# Patient Record
Sex: MALE | Race: WHITE | NOT HISPANIC OR LATINO | Employment: OTHER | URBAN - METROPOLITAN AREA
[De-identification: names, ages, dates, MRNs, and addresses within clinical notes are randomized per-mention and may not be internally consistent; named-entity substitution may affect disease eponyms.]

---

## 2020-12-09 ENCOUNTER — TELEPHONE (OUTPATIENT)
Dept: FAMILY MEDICINE CLINIC | Facility: CLINIC | Age: 65
End: 2020-12-09

## 2020-12-09 NOTE — TELEPHONE ENCOUNTER
Patient needing to refill his medications:  - Metformin HCL 500MG  - Fexofenadine HCL 180MG Tablets  - Losartan Potassium 100MG  - Piogilitazone HCL 15MG    Patient is out of medication and needs these filled.  Patient would like these filled today.  He is scheduled for a new patient appointment on 1/8 at 9:30 with Dr Paredes. He saw him at his previous location.    Verified [facility] on [address] [place] [address].    Patient can be reached at [phone].

## 2020-12-11 NOTE — TELEPHONE ENCOUNTER
I tried calling the patient that [phone] could not get an answer I called his pharmacy and called in prescriptions for the medicines that the patient left with the message.  The patient's did not did not leave instructions such as how many times a day he was taking the medication so I have called and what I could.  Also call back and tell patient that he should schedule an appointment to see me within the next month or so.

## 2021-01-04 RX ORDER — GLIPIZIDE 5 MG/1
TABLET ORAL
Qty: 30 TABLET | Refills: 1 | OUTPATIENT
Start: 2021-01-04

## 2021-01-04 RX ORDER — ATORVASTATIN CALCIUM 20 MG/1
TABLET, FILM COATED ORAL
Qty: 30 TABLET | Refills: 2 | OUTPATIENT
Start: 2021-01-04

## 2021-01-04 NOTE — TELEPHONE ENCOUNTER
"HUB PLEASE INFORM PATIENT, \" PT NEEDS TO SCHEDULE AN APPT BEFORE MEDICATION CAN BE REFILLED.\"    "

## 2021-01-05 ENCOUNTER — TELEPHONE (OUTPATIENT)
Dept: FAMILY MEDICINE CLINIC | Facility: CLINIC | Age: 66
End: 2021-01-05

## 2021-01-05 RX ORDER — ATORVASTATIN CALCIUM 20 MG/1
TABLET, FILM COATED ORAL
Qty: 30 TABLET | Refills: 0 | Status: SHIPPED | OUTPATIENT
Start: 2021-01-05 | End: 2021-01-18 | Stop reason: SDUPTHER

## 2021-01-05 RX ORDER — GLIPIZIDE 5 MG/1
TABLET ORAL
Qty: 30 TABLET | Refills: 0 | Status: SHIPPED | OUTPATIENT
Start: 2021-01-05 | End: 2023-02-17 | Stop reason: SDUPTHER

## 2021-01-05 NOTE — TELEPHONE ENCOUNTER
Caller: Joseph Mcdonald    Relationship: [facility]  [phone]   Best call back number:    Medication needed: Atorvastatin 20 MG, 1 @ bedtime  Glipizide 5 MG 1 AM    When do you need the refill by: 01/08/20    What details did the patient provide when requesting the medication: New patient @ [facility] on 01/18/21.  Will run out in 3 days of the above medications.    Does the patient have less than a 3 day supply:  [x] Yes  [] No    What is the patient's preferred pharmacy:        [facility] [address] - [phone]  - [phone]   [phone]

## 2021-01-18 ENCOUNTER — OFFICE VISIT (OUTPATIENT)
Dept: FAMILY MEDICINE CLINIC | Facility: CLINIC | Age: 66
End: 2021-01-18

## 2021-01-18 VITALS
HEART RATE: 78 BPM | RESPIRATION RATE: 16 BRPM | OXYGEN SATURATION: 98 % | SYSTOLIC BLOOD PRESSURE: 138 MMHG | HEIGHT: 70 IN | BODY MASS INDEX: 45.1 KG/M2 | DIASTOLIC BLOOD PRESSURE: 98 MMHG | TEMPERATURE: 96.1 F | WEIGHT: 315 LBS

## 2021-01-18 DIAGNOSIS — E10.9 TYPE 1 DIABETES MELLITUS WITHOUT COMPLICATION (HCC): ICD-10-CM

## 2021-01-18 DIAGNOSIS — I10 ESSENTIAL HYPERTENSION: ICD-10-CM

## 2021-01-18 DIAGNOSIS — Z91.199 NONCOMPLIANCE: ICD-10-CM

## 2021-01-18 DIAGNOSIS — E66.01 MORBID OBESITY (HCC): Primary | ICD-10-CM

## 2021-01-18 PROBLEM — Z98.84 LAP-BAND SURGERY STATUS: Status: ACTIVE | Noted: 2020-01-06

## 2021-01-18 PROBLEM — E78.5 HYPERLIPIDEMIA: Status: ACTIVE | Noted: 2020-01-06

## 2021-01-18 PROBLEM — IMO0002 DIABETES MELLITUS TYPE 2, UNCONTROLLED, WITH COMPLICATIONS: Status: ACTIVE | Noted: 2020-01-06

## 2021-01-18 PROCEDURE — 99214 OFFICE O/P EST MOD 30 MIN: CPT | Performed by: INTERNAL MEDICINE

## 2021-01-18 RX ORDER — ATORVASTATIN CALCIUM 20 MG/1
TABLET, FILM COATED ORAL
Qty: 30 TABLET | Refills: 0 | Status: SHIPPED | OUTPATIENT
Start: 2021-01-18 | End: 2021-02-09

## 2021-01-18 RX ORDER — VALSARTAN 320 MG/1
320 TABLET ORAL DAILY
COMMUNITY
End: 2021-01-18

## 2021-01-18 RX ORDER — CETIRIZINE HYDROCHLORIDE 10 MG/1
10 TABLET ORAL DAILY
COMMUNITY
End: 2021-08-20

## 2021-01-18 RX ORDER — PIOGLITAZONEHYDROCHLORIDE 15 MG/1
TABLET ORAL
COMMUNITY
Start: 2020-12-11 | End: 2021-02-09

## 2021-01-18 RX ORDER — FUROSEMIDE 40 MG/1
40 TABLET ORAL DAILY
COMMUNITY
End: 2021-01-18

## 2021-01-18 RX ORDER — ALBUTEROL SULFATE 2.5 MG/3ML
2.5 SOLUTION RESPIRATORY (INHALATION)
COMMUNITY
End: 2021-01-18

## 2021-01-18 RX ORDER — DOXAZOSIN MESYLATE 4 MG/1
TABLET ORAL
COMMUNITY
Start: 2020-12-31 | End: 2021-02-09

## 2021-01-18 RX ORDER — GLIPIZIDE 5 MG/1
TABLET ORAL
Qty: 90 TABLET | Refills: 3 | Status: SHIPPED | OUTPATIENT
Start: 2021-01-18 | End: 2021-02-09

## 2021-01-18 RX ORDER — HYDRALAZINE HYDROCHLORIDE 25 MG/1
25 TABLET, FILM COATED ORAL
COMMUNITY
End: 2021-01-18

## 2021-01-18 RX ORDER — NAPROXEN 500 MG/1
500 TABLET ORAL
COMMUNITY
End: 2021-05-11

## 2021-01-18 RX ORDER — PIOGLITAZONEHYDROCHLORIDE 15 MG/1
TABLET ORAL
Qty: 90 TABLET | Refills: 3 | Status: SHIPPED | OUTPATIENT
Start: 2021-01-18

## 2021-01-18 RX ORDER — LOSARTAN POTASSIUM 100 MG/1
TABLET ORAL
COMMUNITY
Start: 2020-12-11 | End: 2021-02-18

## 2021-01-18 RX ORDER — ATORVASTATIN CALCIUM 20 MG/1
20 TABLET, FILM COATED ORAL DAILY
Qty: 90 TABLET | Refills: 3 | Status: SHIPPED | OUTPATIENT
Start: 2021-01-18 | End: 2021-08-20 | Stop reason: SDUPTHER

## 2021-01-18 RX ORDER — ORAL SEMAGLUTIDE 7 MG/1
TABLET ORAL
COMMUNITY
Start: 2021-01-05 | End: 2021-08-20

## 2021-01-18 RX ORDER — MULTIVITAMIN WITH IRON
100 TABLET ORAL DAILY
COMMUNITY
End: 2022-10-26

## 2021-01-18 RX ORDER — DOXAZOSIN MESYLATE 4 MG/1
TABLET ORAL
Qty: 90 TABLET | Refills: 2 | Status: SHIPPED | OUTPATIENT
Start: 2021-01-18 | End: 2022-02-28

## 2021-01-18 RX ORDER — ROSUVASTATIN CALCIUM 5 MG/1
5 TABLET, COATED ORAL DAILY
COMMUNITY
End: 2021-01-18

## 2021-01-18 RX ORDER — GLIMEPIRIDE 4 MG/1
4 TABLET ORAL
COMMUNITY
End: 2021-01-18

## 2021-01-18 RX ORDER — FEXOFENADINE HCL 180 MG/1
180 TABLET ORAL DAILY
COMMUNITY
End: 2021-02-18

## 2021-01-18 RX ORDER — AMLODIPINE BESYLATE 10 MG/1
10 TABLET ORAL DAILY
COMMUNITY
End: 2021-01-18

## 2021-01-19 NOTE — PROGRESS NOTES
01/18/2021    CC: Hypertension (follow up) and Diabetes (follow up)  .        HPI  This patient presents for follow-up of diabetes mellitus type 1.  He is been lost to follow-up for approximately a year.  He relates that he is currently seen by endocrinology with Dr. baig but due to the Covid 19 pandemic he has not seen him since March.       Subjective   Joseph Mcdonald is a 65 y.o. male.      The following portions of the patient's history were reviewed and updated as appropriate: allergies, current medications, past family history, past medical history, past social history, past surgical history and problem list.    Problem List  Patient Active Problem List   Diagnosis   • Diabetes mellitus type 2, uncontrolled, with complications (CMS/HCC)   • HTN (hypertension)   • Hyperlipidemia   • LAP-BAND surgery status   • Morbid obesity (CMS/HCC)   • Morbid obesity with BMI of 50.0-59.9, adult (CMS/HCC)       Past Medical History  Past Medical History:   Diagnosis Date   • Diabetes mellitus (CMS/HCC)    • Hyperlipidemia    • Hypertension        Surgical History  History reviewed. No pertinent surgical history.    Family History  History reviewed. No pertinent family history.    Social History  Social History    Tobacco Use      Smoking status: Not on file       Is the Patient a current tobacco user? No    Allergies  Allergies   Allergen Reactions   • Codeine Diarrhea and Nausea And Vomiting   • Penicillins Other (See Comments)   • Sulfa Antibiotics Other (See Comments)       Current Medications    Current Outpatient Medications:   •  atorvastatin (LIPITOR) 20 MG tablet, 1 q am, Disp: 30 tablet, Rfl: 0  •  cetirizine (zyrTEC) 10 MG tablet, Take 10 mg by mouth Daily., Disp: , Rfl:   •  doxazosin (CARDURA) 4 MG tablet, , Disp: , Rfl:   •  fexofenadine (ALLEGRA) 180 MG tablet, Take 180 mg by mouth Daily., Disp: , Rfl:   •  glipizide (Glucotrol) 5 MG tablet, 1 q am, Disp: 30 tablet, Rfl: 0  •  losartan (COZAAR) 100 MG tablet, ,  Disp: , Rfl:   •  metFORMIN (GLUCOPHAGE) 500 MG tablet, Take 2 tablets by mouth 2 (Two) Times a Day With Meals., Disp: 360 tablet, Rfl: 2  •  naproxen (NAPROSYN) 500 MG tablet, Take 500 mg by mouth., Disp: , Rfl:   •  pioglitazone (ACTOS) 15 MG tablet, , Disp: , Rfl:   •  Rybelsus 7 MG tablet, , Disp: , Rfl:   •  vitamin B-6 (PYRIDOXINE) 100 MG tablet, Take 100 mg by mouth Daily., Disp: , Rfl:   •  atorvastatin (LIPITOR) 20 MG tablet, Take 1 tablet by mouth Daily., Disp: 90 tablet, Rfl: 3  •  doxazosin (Cardura) 4 MG tablet, 1 daily, Disp: 90 tablet, Rfl: 2  •  glipizide (Glucotrol) 5 MG tablet, Take 1 tablet every morning, Disp: 90 tablet, Rfl: 3  •  pioglitazone (Actos) 15 MG tablet, 1 tablet every morning, Disp: 90 tablet, Rfl: 3     Review of System  Review of Systems   Eyes: Negative.    Respiratory: Negative.    Cardiovascular: Negative.    Gastrointestinal: Negative.    Endocrine: Negative.      I have reviewed and confirmed the accuracy of the ROS as documented by the MA/LPN/RN Dexter Paredes MD    Vitals:    01/18/21 1612   BP: 138/98   Pulse: 78   Resp: 16   Temp: 96.1 °F (35.6 °C)   SpO2: 98%     Body mass index is 59.17 kg/m².    Objective     Physical Exam  Physical Exam  Cardiovascular:      Rate and Rhythm: Normal rate and regular rhythm.      Pulses: Normal pulses.      Heart sounds: Normal heart sounds.   Pulmonary:      Effort: Pulmonary effort is normal.      Breath sounds: Normal breath sounds.         Assessment/Plan      This patient presents for follow-up after having been lost to follow-up secondary to the Covid 19 pandemic.  He had seen Dr. baig in the past but has not seen him over the past several months.  Patient has a long history of poorly controlled diabetes and noncompliance.  He is morbidly obese with a weight greater than 400 pounds and a BMI greater than 59.    Patient relates she has not been compliant of his diet again.  He relates he had a heavy sodium meal last night and  pork and beans.  He relates he is knowledgeable of this is not a part of his diet.  He relates that his glucose level today was 105.  Note is made that he has had lap band surgery in the past.    Patient Is Out Of His Medications at This Point We'll Renew Them and Ask Him to Follow-Up with Dr. Baig.             Diagnoses and all orders for this visit:    1. Morbid obesity (CMS/HCC) (Primary)  -     Comprehensive Metabolic Panel  -     T4, Free  -     TSH    2. Type 1 diabetes mellitus without complication (CMS/HCC)  -     Hemoglobin A1c  -     Lipid Panel With / Chol / HDL Ratio  -     Urinalysis With Culture If Indicated -  -     CBC & Differential  -     metFORMIN (GLUCOPHAGE) 500 MG tablet; Take 2 tablets by mouth 2 (Two) Times a Day With Meals.  Dispense: 360 tablet; Refill: 2  -     glipizide (Glucotrol) 5 MG tablet; Take 1 tablet every morning  Dispense: 90 tablet; Refill: 3  -     atorvastatin (LIPITOR) 20 MG tablet; Take 1 tablet by mouth Daily.  Dispense: 90 tablet; Refill: 3  -     atorvastatin (LIPITOR) 20 MG tablet; 1 q am  Dispense: 30 tablet; Refill: 0  -     doxazosin (Cardura) 4 MG tablet; 1 daily  Dispense: 90 tablet; Refill: 2  -     pioglitazone (Actos) 15 MG tablet; 1 tablet every morning  Dispense: 90 tablet; Refill: 3    3. Essential hypertension    4. Noncompliance      Plan:  1.)  Follow-up in 3-4 weeks for reevaluation of glucose control of her to follow-up with Dr. baig for long-standing diabetes evaluation and management       Dexter Paredes MD  01/18/2021

## 2021-01-30 LAB
ALBUMIN SERPL-MCNC: 4 G/DL (ref 3.5–5.2)
ALBUMIN/GLOB SERPL: 1.5 G/DL
ALP SERPL-CCNC: 106 U/L (ref 39–117)
ALT SERPL-CCNC: 20 U/L (ref 1–41)
AST SERPL-CCNC: 20 U/L (ref 1–40)
BASOPHILS # BLD AUTO: 0.06 10*3/MM3 (ref 0–0.2)
BASOPHILS NFR BLD AUTO: 1 % (ref 0–1.5)
BILIRUB SERPL-MCNC: 0.4 MG/DL (ref 0–1.2)
BUN SERPL-MCNC: 11 MG/DL (ref 8–23)
BUN/CREAT SERPL: 14.9 (ref 7–25)
CALCIUM SERPL-MCNC: 9.3 MG/DL (ref 8.6–10.5)
CHLORIDE SERPL-SCNC: 99 MMOL/L (ref 98–107)
CHOLEST SERPL-MCNC: 177 MG/DL (ref 0–200)
CHOLEST/HDLC SERPL: 4.32 {RATIO}
CO2 SERPL-SCNC: 31.2 MMOL/L (ref 22–29)
CREAT SERPL-MCNC: 0.74 MG/DL (ref 0.76–1.27)
EOSINOPHIL # BLD AUTO: 0.07 10*3/MM3 (ref 0–0.4)
EOSINOPHIL NFR BLD AUTO: 1.2 % (ref 0.3–6.2)
ERYTHROCYTE [DISTWIDTH] IN BLOOD BY AUTOMATED COUNT: 12.7 % (ref 12.3–15.4)
GLOBULIN SER CALC-MCNC: 2.7 GM/DL
GLUCOSE SERPL-MCNC: 193 MG/DL (ref 65–99)
HBA1C MFR BLD: 7.8 % (ref 4.8–5.6)
HCT VFR BLD AUTO: 44.5 % (ref 37.5–51)
HDLC SERPL-MCNC: 41 MG/DL (ref 40–60)
HGB BLD-MCNC: 14.6 G/DL (ref 13–17.7)
IMM GRANULOCYTES # BLD AUTO: 0.02 10*3/MM3 (ref 0–0.05)
IMM GRANULOCYTES NFR BLD AUTO: 0.3 % (ref 0–0.5)
LDLC SERPL CALC-MCNC: 110 MG/DL (ref 0–100)
LYMPHOCYTES # BLD AUTO: 1.34 10*3/MM3 (ref 0.7–3.1)
LYMPHOCYTES NFR BLD AUTO: 23.3 % (ref 19.6–45.3)
MCH RBC QN AUTO: 30.1 PG (ref 26.6–33)
MCHC RBC AUTO-ENTMCNC: 32.8 G/DL (ref 31.5–35.7)
MCV RBC AUTO: 91.8 FL (ref 79–97)
MONOCYTES # BLD AUTO: 0.52 10*3/MM3 (ref 0.1–0.9)
MONOCYTES NFR BLD AUTO: 9 % (ref 5–12)
NEUTROPHILS # BLD AUTO: 3.74 10*3/MM3 (ref 1.7–7)
NEUTROPHILS NFR BLD AUTO: 65.2 % (ref 42.7–76)
NRBC BLD AUTO-RTO: 0 /100 WBC (ref 0–0.2)
PLATELET # BLD AUTO: 207 10*3/MM3 (ref 140–450)
POTASSIUM SERPL-SCNC: 4.5 MMOL/L (ref 3.5–5.2)
PROT SERPL-MCNC: 6.7 G/DL (ref 6–8.5)
RBC # BLD AUTO: 4.85 10*6/MM3 (ref 4.14–5.8)
SODIUM SERPL-SCNC: 141 MMOL/L (ref 136–145)
T4 FREE SERPL-MCNC: 1.11 NG/DL (ref 0.93–1.7)
TRIGL SERPL-MCNC: 147 MG/DL (ref 0–150)
TSH SERPL DL<=0.005 MIU/L-ACNC: 2.5 UIU/ML (ref 0.27–4.2)
UNABLE TO VOID: NORMAL
VLDLC SERPL CALC-MCNC: 26 MG/DL (ref 5–40)
WBC # BLD AUTO: 5.75 10*3/MM3 (ref 3.4–10.8)

## 2021-02-04 DIAGNOSIS — I10 HYPERTENSION, UNSPECIFIED TYPE: Primary | ICD-10-CM

## 2021-02-04 DIAGNOSIS — J30.1 ALLERGIC RHINITIS DUE TO POLLEN, UNSPECIFIED SEASONALITY: ICD-10-CM

## 2021-02-09 ENCOUNTER — OFFICE VISIT (OUTPATIENT)
Dept: FAMILY MEDICINE CLINIC | Facility: CLINIC | Age: 66
End: 2021-02-09

## 2021-02-09 VITALS
HEART RATE: 64 BPM | HEIGHT: 70 IN | WEIGHT: 315 LBS | DIASTOLIC BLOOD PRESSURE: 88 MMHG | OXYGEN SATURATION: 99 % | TEMPERATURE: 98.1 F | SYSTOLIC BLOOD PRESSURE: 140 MMHG | BODY MASS INDEX: 45.1 KG/M2 | RESPIRATION RATE: 16 BRPM

## 2021-02-09 DIAGNOSIS — Z83.71 FH: COLON POLYPS: Primary | Chronic | ICD-10-CM

## 2021-02-09 DIAGNOSIS — Z00.00 PE (PHYSICAL EXAM), ANNUAL: ICD-10-CM

## 2021-02-09 PROCEDURE — 99396 PREV VISIT EST AGE 40-64: CPT | Performed by: INTERNAL MEDICINE

## 2021-02-10 NOTE — PROGRESS NOTES
2021    CC: Annual Exam (...no other issues)  .        HPI  History of Present Illness     Subjective   Joseph Mcdonald is a 65 y.o. male.      The following portions of the patient's history were reviewed and updated as appropriate: allergies, current medications, past family history, past medical history, past social history, past surgical history and problem list.    Problem List  Patient Active Problem List   Diagnosis   • Diabetes mellitus type 2, uncontrolled, with complications (CMS/[facility])   • HTN (hypertension)   • Hyperlipidemia   • LAP-BAND surgery status   • Morbid obesity (CMS/HCC)   • Morbid obesity with BMI of 50.0-59.9, adult (CMS/[facility])       Past Medical History  Past Medical History:   Diagnosis Date   • Diabetes mellitus (CMS/HCC)    • Hyperlipidemia    • Hypertension        Surgical History  History reviewed. No pertinent surgical history.    Family History  History reviewed. No pertinent family history.    Social History  Social History    Tobacco Use      Smoking status: Former Smoker        Packs/day: 0.50        Years: 10.00        Pack years: 5        Types: Cigarettes        Quit date:         Years since quittin.1      Smokeless tobacco: Never Used       Is the Patient a current tobacco user? No    Allergies  Allergies   Allergen Reactions   • Codeine Diarrhea and Nausea And Vomiting   • Penicillins Other (See Comments)   • Sulfa Antibiotics Other (See Comments)       Current Medications    Current Outpatient Medications:   •  atorvastatin (LIPITOR) 20 MG tablet, Take 1 tablet by mouth Daily., Disp: 90 tablet, Rfl: 3  •  cetirizine (zyrTEC) 10 MG tablet, Take 10 mg by mouth Daily., Disp: , Rfl:   •  doxazosin (Cardura) 4 MG tablet, 1 daily (Patient taking differently: Take 4 mg by mouth Every Night. 1 daily), Disp: 90 tablet, Rfl: 2  •  fexofenadine (ALLEGRA) 180 MG tablet, Take 180 mg by mouth Daily., Disp: , Rfl:   •  glipizide (Glucotrol) 5 MG tablet, 1 q am, Disp: 30 tablet,  Rfl: 0  •  losartan (COZAAR) 100 MG tablet, , Disp: , Rfl:   •  metFORMIN (GLUCOPHAGE) 500 MG tablet, Take 2 tablets by mouth 2 (Two) Times a Day With Meals., Disp: 360 tablet, Rfl: 2  •  naproxen (NAPROSYN) 500 MG tablet, Take 500 mg by mouth., Disp: , Rfl:   •  pioglitazone (Actos) 15 MG tablet, 1 tablet every morning, Disp: 90 tablet, Rfl: 3  •  Rybelsus 7 MG tablet, , Disp: , Rfl:   •  vitamin B-6 (PYRIDOXINE) 100 MG tablet, Take 100 mg by mouth Daily., Disp: , Rfl:      Review of System  Review of Systems   Constitutional: Negative.    HENT: Negative.    Eyes: Negative.    Respiratory: Negative.    Cardiovascular: Negative.    Gastrointestinal: Negative.    Musculoskeletal: Negative.    Skin: Negative.    Psychiatric/Behavioral: Negative.      I have reviewed and confirmed the accuracy of the ROS as documented by the MA/LPN/RN Dexter Paredes MD    Vitals:    02/09/21 1308   BP: 140/88   Pulse: 64   Resp: 16   Temp: 98.1 °F (36.7 °C)   SpO2: 99%     Body mass index is 59.12 kg/m².    Objective     Physical Exam  Physical Exam  Constitutional:       Appearance: Normal appearance. He is obese.   HENT:      Head: Normocephalic and atraumatic.      Right Ear: External ear normal.      Left Ear: External ear normal.      Nose: Nose normal.   Eyes:      Extraocular Movements: Extraocular movements intact.   Neck:      Musculoskeletal: Normal range of motion.   Cardiovascular:      Rate and Rhythm: Normal rate and regular rhythm.      Pulses: Normal pulses.      Heart sounds: Normal heart sounds.   Pulmonary:      Effort: Pulmonary effort is normal.      Breath sounds: Normal breath sounds.   Musculoskeletal: Normal range of motion.   Skin:     General: Skin is warm and dry.   Neurological:      General: No focal deficit present.      Mental Status: He is alert and oriented to person, place, and time.   Psychiatric:         Mood and Affect: Mood normal.         Assessment/Plan    This patient presents for physical  examination.  He relates she's feeling fine, he's had no problems in the past week.    He is morbidly obese having a BMI of greater than 59.1.  He relates that he realizes he has a problem with weight but he enjoys food and he wants to continue eating.    The patient's last hemoglobin A1c done about a month ago was elevated at 7.8.  He realizes that this is due to his poor dietary compliance and his inconsistent taking medication.  He relates that he enjoys eating and realizes that.    He lives with 2 sisters and is unmarried.    We discussed  The Covid 19 program.  The patient relates that he does not want to take any vaccine he has not had a vaccine for influenza or pneumonia and relates that he just does not want to take them.    He relates that his last colonoscopy was greater than 10 years ago.  But he refuses to have a colonoscopy understanding the increased risk of colon cancer and other GI problems.            Diagnoses and all orders for this visit:    1. FH: colon polyps (Primary)  -     POCT Occult blood x 3, stool  -     Cologuard - Stool, Per Rectum; Future    2. PE (physical exam), annual             Dexter Paredes MD  02/09/2021

## 2021-02-18 DIAGNOSIS — I10 HYPERTENSION, UNSPECIFIED TYPE: Primary | ICD-10-CM

## 2021-02-18 DIAGNOSIS — J30.2 SEASONAL ALLERGIES: ICD-10-CM

## 2021-02-18 RX ORDER — LOSARTAN POTASSIUM 100 MG/1
TABLET ORAL
Qty: 30 TABLET | Refills: 3 | Status: SHIPPED | OUTPATIENT
Start: 2021-02-18 | End: 2021-08-24

## 2021-02-18 RX ORDER — FEXOFENADINE HCL 180 MG/1
TABLET ORAL
Qty: 30 TABLET | Refills: 3 | Status: SHIPPED | OUTPATIENT
Start: 2021-02-18 | End: 2021-09-13

## 2021-02-18 RX ORDER — LOSARTAN POTASSIUM 100 MG/1
100 TABLET ORAL DAILY
Qty: 30 TABLET | Refills: 5 | Status: CANCELLED | OUTPATIENT
Start: 2021-02-18

## 2021-02-18 RX ORDER — FEXOFENADINE HCL 180 MG/1
180 TABLET ORAL DAILY
Qty: 30 TABLET | Refills: 5 | Status: CANCELLED | OUTPATIENT
Start: 2021-02-18

## 2021-05-11 ENCOUNTER — OFFICE VISIT (OUTPATIENT)
Dept: FAMILY MEDICINE CLINIC | Facility: CLINIC | Age: 66
End: 2021-05-11

## 2021-05-11 VITALS
HEART RATE: 83 BPM | RESPIRATION RATE: 16 BRPM | BODY MASS INDEX: 59.12 KG/M2 | HEIGHT: 70 IN | SYSTOLIC BLOOD PRESSURE: 120 MMHG | DIASTOLIC BLOOD PRESSURE: 78 MMHG | OXYGEN SATURATION: 98 %

## 2021-05-11 DIAGNOSIS — Z12.5 SCREENING FOR PROSTATE CANCER: ICD-10-CM

## 2021-05-11 DIAGNOSIS — IMO0002 DIABETES MELLITUS TYPE 2, UNCONTROLLED, WITH COMPLICATIONS: Primary | ICD-10-CM

## 2021-05-11 DIAGNOSIS — R35.1 NOCTURIA: ICD-10-CM

## 2021-05-11 LAB
EXPIRATION DATE: NORMAL
HBA1C MFR BLD: 7 %
Lab: NORMAL

## 2021-05-11 PROCEDURE — 99213 OFFICE O/P EST LOW 20 MIN: CPT | Performed by: INTERNAL MEDICINE

## 2021-05-11 PROCEDURE — 36416 COLLJ CAPILLARY BLOOD SPEC: CPT | Performed by: INTERNAL MEDICINE

## 2021-05-11 PROCEDURE — 83036 HEMOGLOBIN GLYCOSYLATED A1C: CPT | Performed by: INTERNAL MEDICINE

## 2021-05-11 RX ORDER — FLASH GLUCOSE SENSOR
1 KIT MISCELLANEOUS
COMMUNITY
Start: 2021-03-29 | End: 2023-02-17

## 2021-05-12 LAB
ALBUMIN/CREAT UR: 116 MG/G CREAT (ref 0–29)
CREAT UR-MCNC: 87 MG/DL
MICROALBUMIN UR-MCNC: 100.9 UG/ML
PSA SERPL-MCNC: 0.65 NG/ML (ref 0–4)

## 2021-05-13 NOTE — PROGRESS NOTES
2021    CC: Diabetes (follow up.  diarrhea due to medication.  overactive bladder)  .        HPI  Diabetes  He presents for his follow-up diabetic visit. He has type 2 diabetes mellitus. No MedicAlert identification noted. His disease course has been stable. There are no hypoglycemic associated symptoms. There are no diabetic associated symptoms. There are no hypoglycemic complications. Symptoms are worsening. There are no diabetic complications.        Subjective   Joseph Mcdonald is a 65 y.o. male.      The following portions of the patient's history were reviewed and updated as appropriate: allergies, current medications, past family history, past medical history, past social history, past surgical history and problem list.    Problem List  Patient Active Problem List   Diagnosis   • Diabetes mellitus type 2, uncontrolled, with complications (CMS/[facility])   • HTN (hypertension)   • Hyperlipidemia   • LAP-BAND surgery status   • Morbid obesity (CMS/[facility])   • Morbid obesity with BMI of 50.0-59.9, adult (CMS/[facility])       Past Medical History  Past Medical History:   Diagnosis Date   • Diabetes mellitus (CMS/[facility])    • Hyperlipidemia    • Hypertension        Surgical History  History reviewed. No pertinent surgical history.    Family History  History reviewed. No pertinent family history.    Social History  Social History    Tobacco Use      Smoking status: Former Smoker        Packs/day: 0.50        Years: 10.00        Pack years: 5        Types: Cigarettes        Quit date: 1970        Years since quittin.3      Smokeless tobacco: Never Used       Is the Patient a current tobacco user? No    Allergies  Allergies   Allergen Reactions   • Codeine Diarrhea and Nausea And Vomiting   • Penicillins Other (See Comments)   • Sulfa Antibiotics Other (See Comments)       Current Medications    Current Outpatient Medications:   •  atorvastatin (LIPITOR) 20 MG tablet, Take 1 tablet by mouth Daily., Disp: 90 tablet, Rfl: 3  •   cetirizine (zyrTEC) 10 MG tablet, Take 10 mg by mouth Daily., Disp: , Rfl:   •  Continuous Blood Gluc Sensor (FreeStyle Missy 14 Day Sensor) misc, Apply 1 Device topically to the appropriate area as directed., Disp: , Rfl:   •  doxazosin (Cardura) 4 MG tablet, 1 daily (Patient taking differently: Take 4 mg by mouth Every Night. 1 daily), Disp: 90 tablet, Rfl: 2  •  fexofenadine (ALLEGRA) 180 MG tablet, TAKE ONE TABLET BY MOUTH DAILY, Disp: 30 tablet, Rfl: 3  •  glipizide (Glucotrol) 5 MG tablet, 1 q am, Disp: 30 tablet, Rfl: 0  •  losartan (COZAAR) 100 MG tablet, TAKE ONE TABLET BY MOUTH EVERY MORNING, Disp: 30 tablet, Rfl: 3  •  metFORMIN (GLUCOPHAGE) 500 MG tablet, Take 2 tablets by mouth 2 (Two) Times a Day With Meals., Disp: 360 tablet, Rfl: 2  •  pioglitazone (Actos) 15 MG tablet, 1 tablet every morning, Disp: 90 tablet, Rfl: 3  •  Rybelsus 7 MG tablet, , Disp: , Rfl:   •  vitamin B-6 (PYRIDOXINE) 100 MG tablet, Take 100 mg by mouth Daily., Disp: , Rfl:      Review of System  Review of Systems   Respiratory: Negative.    Cardiovascular: Negative.    Gastrointestinal: Negative.    Endocrine: Negative.      I have reviewed and confirmed the accuracy of the ROS as documented by the MA/LPN/RN Dexter Paredes MD    Vitals:    05/11/21 1058   BP: 120/78   Pulse: 83   Resp: 16   SpO2: 98%     Body mass index is 59.12 kg/m².    Objective     Physical Exam  Physical Exam  Cardiovascular:      Rate and Rhythm: Normal rate and regular rhythm.      Pulses: Normal pulses.      Heart sounds: Normal heart sounds.   Pulmonary:      Effort: Pulmonary effort is normal.      Breath sounds: Normal breath sounds.   Abdominal:      General: Abdomen is flat.      Palpations: Abdomen is soft.         Assessment/Plan      This pleasant 65-year-old presents at this time for follow-up of diabetes mellitus type 2.  He is usually seen by Dr. baig endocrinologist with [facility] but the patient has some trouble getting in and so we will  see him today.  The patient was last seen by me on 1/21 his hemoglobin A1c was 7.8.    Patient's hemoglobin A1c today is 7.0.  He is currently on Actos, metformin glipizide and Rebelsus    Patient's blood pressure well controlled today at 120/78 in the left arm sitting position standard cuff.    His foot exam was unremarkable.  Monofilament discrimination was normal throughout both feet.  Good pulsations were appreciated anterior tibial and dorsalis pedis pulses.    Patient relates that he's had nocturia for the past week.  We'll check for the possibility of urinary tract infection versus BPH today and see him in follow-up.    He is urged to follow-up with Dr. baig regarding ongoing diabetes care and would complement the patient on obtaining a hemoglobin A1c of 7.0.    Diagnoses and all orders for this visit:    1. Diabetes mellitus type 2, uncontrolled, with complications (CMS/[facility]) (Primary)  -     POCT glycated hemoglobin, total; Standing  -     POCT glycated hemoglobin, total  -     Cancel: Microalbumin / Creatinine Urine Ratio - Urine, Clean Catch; Future  -     Microalbumin / Creatinine Urine Ratio - Urine, Clean Catch    2. Nocturia  -     Cancel: Urinalysis With Culture If Indicated -  -     Urinalysis With Culture If Indicated -    3. Screening for prostate cancer  -     Cancel: PSA SCREENING; Future  -     PSA SCREENING             eDxter Paredes MD  05/11/2021

## 2021-08-20 ENCOUNTER — OFFICE VISIT (OUTPATIENT)
Dept: FAMILY MEDICINE CLINIC | Facility: CLINIC | Age: 66
End: 2021-08-20

## 2021-08-20 VITALS
SYSTOLIC BLOOD PRESSURE: 110 MMHG | HEIGHT: 70 IN | RESPIRATION RATE: 16 BRPM | WEIGHT: 315 LBS | DIASTOLIC BLOOD PRESSURE: 70 MMHG | BODY MASS INDEX: 45.1 KG/M2

## 2021-08-20 DIAGNOSIS — IMO0002 DIABETES MELLITUS TYPE 2, UNCONTROLLED, WITH COMPLICATIONS: Chronic | ICD-10-CM

## 2021-08-20 DIAGNOSIS — I10 ESSENTIAL HYPERTENSION: Primary | ICD-10-CM

## 2021-08-20 LAB
EXPIRATION DATE: ABNORMAL
HBA1C MFR BLD: 7.3 %
Lab: ABNORMAL

## 2021-08-20 PROCEDURE — 83036 HEMOGLOBIN GLYCOSYLATED A1C: CPT | Performed by: INTERNAL MEDICINE

## 2021-08-20 PROCEDURE — 36416 COLLJ CAPILLARY BLOOD SPEC: CPT | Performed by: INTERNAL MEDICINE

## 2021-08-20 PROCEDURE — 99214 OFFICE O/P EST MOD 30 MIN: CPT | Performed by: INTERNAL MEDICINE

## 2021-08-20 PROCEDURE — 3051F HG A1C>EQUAL 7.0%<8.0%: CPT | Performed by: INTERNAL MEDICINE

## 2021-08-20 NOTE — PROGRESS NOTES
2021    CC: Diabetes (f/u...no other issues)  .        HPI  Diabetes  He presents for his follow-up diabetic visit. He has type 2 diabetes mellitus. No MedicAlert identification noted. His disease course has been stable. There are no hypoglycemic associated symptoms. There are no diabetic associated symptoms. There are no hypoglycemic complications.        Subjective   Joseph Mcdonald is a 65 y.o. male.      The following portions of the patient's history were reviewed and updated as appropriate: allergies, current medications, past family history, past medical history, past social history, past surgical history and problem list.    Problem List  Patient Active Problem List   Diagnosis   • Diabetes mellitus type 2, uncontrolled, with complications (CMS/[facility])   • HTN (hypertension)   • Hyperlipidemia   • LAP-BAND surgery status   • Morbid obesity (CMS/[facility])   • Morbid obesity with BMI of 50.0-59.9, adult (CMS/[facility])       Past Medical History  Past Medical History:   Diagnosis Date   • Diabetes mellitus (CMS/[facility])    • Hyperlipidemia    • Hypertension        Surgical History  History reviewed. No pertinent surgical history.    Family History  History reviewed. No pertinent family history.    Social History  Social History    Tobacco Use      Smoking status: Former Smoker        Packs/day: 0.50        Years: 10.00        Pack years: 5        Types: Cigarettes        Quit date: 1970        Years since quittin.6      Smokeless tobacco: Never Used       Is the Patient a current tobacco user? No    Allergies  Allergies   Allergen Reactions   • Codeine Diarrhea and Nausea And Vomiting   • Penicillins Other (See Comments)   • Sulfa Antibiotics Other (See Comments)       Current Medications    Current Outpatient Medications:   •  Continuous Blood Gluc Sensor (FreeStyle Missy 14 Day Sensor) misc, Apply 1 Device topically to the appropriate area as directed., Disp: , Rfl:   •  doxazosin (Cardura) 4 MG tablet, 1 daily  (Patient taking differently: Take 4 mg by mouth Every Night. 1 daily), Disp: 90 tablet, Rfl: 2  •  fexofenadine (ALLEGRA) 180 MG tablet, TAKE ONE TABLET BY MOUTH DAILY, Disp: 30 tablet, Rfl: 3  •  glipizide (Glucotrol) 5 MG tablet, 1 q am (Patient taking differently: Take 5 mg by mouth 2 (Two) Times a Day Before Meals.), Disp: 30 tablet, Rfl: 0  •  losartan (COZAAR) 100 MG tablet, TAKE ONE TABLET BY MOUTH EVERY MORNING, Disp: 30 tablet, Rfl: 3  •  metFORMIN (GLUCOPHAGE) 500 MG tablet, Take 2 tablets by mouth 2 (Two) Times a Day With Meals., Disp: 360 tablet, Rfl: 2  •  pioglitazone (Actos) 15 MG tablet, 1 tablet every morning (Patient taking differently: 30 mg. 1 tablet every morning), Disp: 90 tablet, Rfl: 3  •  vitamin B-6 (PYRIDOXINE) 100 MG tablet, Take 100 mg by mouth Daily., Disp: , Rfl:      Review of System  Review of Systems   Constitutional: Negative.    HENT: Negative.    Eyes: Negative.    Respiratory: Negative.    Cardiovascular: Negative.      I have reviewed and confirmed the accuracy of the ROS as documented by the MA/LPN/RN Dexter Paredes MD    Vitals:    08/20/21 1110   BP: 110/70   Resp: 16     Body mass index is 60.84 kg/m².    Objective     Physical Exam  Physical Exam  Constitutional:       Appearance: He is obese.   HENT:      Head: Normocephalic and atraumatic.   Cardiovascular:      Rate and Rhythm: Normal rate and regular rhythm.      Pulses: Normal pulses.      Heart sounds: Normal heart sounds.   Pulmonary:      Effort: Pulmonary effort is normal.      Breath sounds: Normal breath sounds.         Assessment/Plan      This pleasant 65-year-old presents at this time for follow-up of diabetes mellitus.  He relates he is feeling fine has had no problems in the interim of visits.  We note that is gained 12 pounds from his last visit up to 424 from prior 412.  His hemoglobin A1c worsened from a previous 7.02 today 7.3.  Note is made that 6 months ago his hemoglobin A1c was 7.8.  Patient  blood pressure is well controlled at 110/70 in the left arm sitting position standard cuff.  He relates he is taking his losartan 100 mg p.o. daily as prescribed.  In the interim of visits he is began to see Dr. Ron Hannah for diabetes control.  He was recently taken off Rybelsus and this caused a marked improvement in his diarrhea.        Diagnoses and all orders for this visit:    1. Essential hypertension (Primary)  Comments:  Controlled    2. Diabetes mellitus type 2, uncontrolled, with complications (CMS/[facility])  Comments:  Poorly controlled  Orders:  -     POCT glycated hemoglobin, total         Plan:  1.)  Follow-up in 6 months for physical examination.  We will remand him to endocrinology for follow-up of his diabetes mellitus at this point.    Dexter Paredes MD  08/20/2021

## 2021-08-24 DIAGNOSIS — I10 HYPERTENSION, UNSPECIFIED TYPE: ICD-10-CM

## 2021-08-24 RX ORDER — LOSARTAN POTASSIUM 100 MG/1
TABLET ORAL
Qty: 30 TABLET | Refills: 3 | Status: SHIPPED | OUTPATIENT
Start: 2021-08-24 | End: 2022-06-29 | Stop reason: SDUPTHER

## 2021-09-12 DIAGNOSIS — J30.1 ALLERGIC RHINITIS DUE TO POLLEN, UNSPECIFIED SEASONALITY: ICD-10-CM

## 2021-09-13 RX ORDER — FEXOFENADINE HCL 180 MG/1
TABLET ORAL
Qty: 30 TABLET | Refills: 3 | Status: SHIPPED | OUTPATIENT
Start: 2021-09-13 | End: 2022-02-28

## 2021-12-10 ENCOUNTER — OFFICE VISIT (OUTPATIENT)
Dept: FAMILY MEDICINE CLINIC | Facility: CLINIC | Age: 66
End: 2021-12-10

## 2021-12-10 VITALS
BODY MASS INDEX: 45.1 KG/M2 | HEART RATE: 76 BPM | OXYGEN SATURATION: 93 % | HEIGHT: 70 IN | WEIGHT: 315 LBS | DIASTOLIC BLOOD PRESSURE: 88 MMHG | SYSTOLIC BLOOD PRESSURE: 132 MMHG

## 2021-12-10 DIAGNOSIS — M79.604 LEG PAIN, CENTRAL, RIGHT: ICD-10-CM

## 2021-12-10 DIAGNOSIS — L03.115 CELLULITIS OF RIGHT LOWER EXTREMITY: Primary | ICD-10-CM

## 2021-12-10 PROCEDURE — 99215 OFFICE O/P EST HI 40 MIN: CPT | Performed by: INTERNAL MEDICINE

## 2021-12-14 NOTE — PROGRESS NOTES
12/10/2021    CC: Leg Swelling (Right leg up to knee, week )  .        HPI  Leg Swelling  This is a new problem. The current episode started in the past 7 days. The problem occurs constantly. The problem has been waxing and waning. Nothing aggravates the symptoms. He has tried nothing for the symptoms.        Subjective   Joseph Mcdonald is a 66 y.o. male.      The following portions of the patient's history were reviewed and updated as appropriate: allergies, current medications, past family history, past medical history, past social history, past surgical history and problem list.    Problem List  Patient Active Problem List   Diagnosis   • Diabetes mellitus type 2, uncontrolled, with complications (HCC)   • HTN (hypertension)   • Hyperlipidemia   • LAP-BAND surgery status   • Morbid obesity (HCC)   • Morbid obesity with BMI of 50.0-59.9, adult (HCC)       Past Medical History  Past Medical History:   Diagnosis Date   • Diabetes mellitus (HCC)    • Hyperlipidemia    • Hypertension        Surgical History  History reviewed. No pertinent surgical history.    Family History  History reviewed. No pertinent family history.    Social History  Social History    Tobacco Use      Smoking status: Former Smoker        Packs/day: 0.50        Years: 10.00        Pack years: 5        Types: Cigarettes        Quit date:         Years since quittin.9      Smokeless tobacco: Never Used       Is the Patient a current tobacco user? No    Allergies  Allergies   Allergen Reactions   • Codeine Diarrhea and Nausea And Vomiting   • Penicillins Other (See Comments)   • Sulfa Antibiotics Other (See Comments)       Current Medications    Current Outpatient Medications:   •  Continuous Blood Gluc Sensor (FreeStyle Missy 14 Day Sensor) misc, Apply 1 Device topically to the appropriate area as directed., Disp: , Rfl:   •  doxazosin (Cardura) 4 MG tablet, 1 daily (Patient taking differently: Take 4 mg by mouth Every Night. 1 daily), Disp:  90 tablet, Rfl: 2  •  fexofenadine (ALLEGRA) 180 MG tablet, TAKE ONE TABLET BY MOUTH DAILY, Disp: 30 tablet, Rfl: 3  •  glipizide (Glucotrol) 5 MG tablet, 1 q am (Patient taking differently: Take 5 mg by mouth 2 (Two) Times a Day Before Meals.), Disp: 30 tablet, Rfl: 0  •  losartan (COZAAR) 100 MG tablet, TAKE ONE TABLET BY MOUTH EVERY MORNING, Disp: 30 tablet, Rfl: 3  •  metFORMIN (GLUCOPHAGE) 500 MG tablet, Take 2 tablets by mouth 2 (Two) Times a Day With Meals., Disp: 360 tablet, Rfl: 2  •  pioglitazone (Actos) 15 MG tablet, 1 tablet every morning (Patient taking differently: 30 mg. 1 tablet every morning), Disp: 90 tablet, Rfl: 3  •  vitamin B-6 (PYRIDOXINE) 100 MG tablet, Take 100 mg by mouth Daily., Disp: , Rfl:      Review of System  Review of Systems   HENT: Negative.    Respiratory: Negative.    Cardiovascular: Negative.    Gastrointestinal: Negative.    Endocrine: Negative.      I have reviewed and confirmed the accuracy of the ROS as documented by the MA/LPN/RN Dexter Paredes MD    Vitals:    12/10/21 1143   BP: 132/88   Pulse: 76   SpO2: 93%     Body mass index is 63.71 kg/m².    Objective     Physical Exam  Physical Exam  HENT:      Head: Normocephalic and atraumatic.   Cardiovascular:      Rate and Rhythm: Normal rate and regular rhythm.      Pulses: Normal pulses.      Heart sounds: Normal heart sounds.   Pulmonary:      Effort: Pulmonary effort is normal.      Breath sounds: Normal breath sounds.   Musculoskeletal:         General: Swelling and tenderness present.      Cervical back: Normal range of motion and neck supple.      Comments: The right lower extremity in addition to cellulitis as a point of maximum redness and tenderness along the gastrocnemius.  The right lower extremity and cellulitis area is warm to touch.         Assessment/Plan      This pleasant patient with a BMI of greater than 63 presents with pain in the right lower extremity with increased warmth x7 days.  Patient is use  ibuprofen without improvement in discomfort or pain.  He denies any trauma to the area.  His blood pressure is well controlled at 132/88 in the left arm sitting position standard cuff.  He denies any shortness of breath.    I am concerned about DVT in the right lower extremity and will be sending him to the emergency room for further evaluation.          Diagnoses and all orders for this visit:    1. Cellulitis of right lower extremity (Primary)    2. Leg pain, central, right      Plan:  1.)  Patient is present to the emergency room for further evaluation of possible DVT in the right lower extremity.       Dexter Paredes MD  12/10/2021

## 2022-01-11 ENCOUNTER — TELEPHONE (OUTPATIENT)
Dept: FAMILY MEDICINE CLINIC | Facility: CLINIC | Age: 67
End: 2022-01-11

## 2022-01-11 NOTE — TELEPHONE ENCOUNTER
PATIENT CALLED STATING THAT HIS RIGHT LEG IS NOT ANY BETTER, ALSO MENTIONING THAT HIS LEFT LEG IS STARTING TO DO THE SAME THING.    PATIENT SAID THAT DR. ROGERS WAS WORRIED THAT IT MAY BE A BLOOD CLOT, BUT THE EMERGENCY ROOM TOLD HIM THAT IT'S A BACTERIAL INFECTION.    THE THREE MEDICATIONS THAT HE WAS GIVEN ARE NOT HELPING.    PLEASE ADVISE  [phone]

## 2022-01-12 ENCOUNTER — OFFICE VISIT (OUTPATIENT)
Dept: FAMILY MEDICINE CLINIC | Facility: CLINIC | Age: 67
End: 2022-01-12

## 2022-01-12 VITALS
HEIGHT: 70 IN | OXYGEN SATURATION: 93 % | BODY MASS INDEX: 45.1 KG/M2 | TEMPERATURE: 96.6 F | WEIGHT: 315 LBS | HEART RATE: 54 BPM | DIASTOLIC BLOOD PRESSURE: 90 MMHG | RESPIRATION RATE: 20 BRPM | SYSTOLIC BLOOD PRESSURE: 130 MMHG

## 2022-01-12 DIAGNOSIS — L03.116 CELLULITIS OF LEFT LOWER EXTREMITY WITHOUT FOOT: Primary | Chronic | ICD-10-CM

## 2022-01-12 PROBLEM — Z00.00 HEALTH CARE MAINTENANCE: Status: ACTIVE | Noted: 2021-11-22

## 2022-01-12 PROCEDURE — 99214 OFFICE O/P EST MOD 30 MIN: CPT | Performed by: INTERNAL MEDICINE

## 2022-01-12 RX ORDER — DOXYCYCLINE HYCLATE 100 MG/1
100 CAPSULE ORAL 2 TIMES DAILY
Qty: 28 CAPSULE | Refills: 0 | Status: SHIPPED | OUTPATIENT
Start: 2022-01-12 | End: 2022-02-01

## 2022-01-12 RX ORDER — CYCLOBENZAPRINE HCL 10 MG
10 TABLET ORAL
COMMUNITY
Start: 2021-12-11 | End: 2022-01-14

## 2022-01-12 NOTE — TELEPHONE ENCOUNTER
I advised the patient that we will try to get him in tomorrow he should call the office to see if they are able to make those arrangements if not I will try to talk to them tomorrow.  I would like to review the medications he has and to culture his wounds.

## 2022-01-15 NOTE — PROGRESS NOTES
2022    CC: Leg Pain (bilateral) and Mass (lower stomach and below his stomach)  .        HPI  Leg Pain   The incident occurred 3 to 5 days ago. The incident occurred at home. There was no injury mechanism. The quality of the pain is described as aching. The pain has been intermittent since onset. He reports no foreign bodies present. Nothing aggravates the symptoms.        Subjective   Joseph Mcdonald is a 66 y.o. male.      The following portions of the patient's history were reviewed and updated as appropriate: allergies, current medications, past family history, past medical history, past social history, past surgical history and problem list.    Problem List  Patient Active Problem List   Diagnosis   • Diabetes mellitus type 2, uncontrolled, with complications (HCC)   • HTN (hypertension)   • Hyperlipidemia   • LAP-BAND surgery status   • Morbid obesity (HCC)   • Morbid obesity with BMI of 50.0-59.9, adult (HCC)   • Health care maintenance       Past Medical History  Past Medical History:   Diagnosis Date   • Diabetes mellitus (HCC)    • Hyperlipidemia    • Hypertension        Surgical History  History reviewed. No pertinent surgical history.    Family History  History reviewed. No pertinent family history.    Social History  Social History    Tobacco Use      Smoking status: Former Smoker        Packs/day: 0.50        Years: 10.00        Pack years: 5        Types: Cigarettes        Quit date: 1970        Years since quittin.0      Smokeless tobacco: Never Used       Is the Patient a current tobacco user? No    Allergies  Allergies   Allergen Reactions   • Codeine Diarrhea and Nausea And Vomiting   • Penicillins Other (See Comments)   • Sulfa Antibiotics Other (See Comments)       Current Medications    Current Outpatient Medications:   •  Continuous Blood Gluc Sensor (FreeStyle Missy 14 Day Sensor) misc, Apply 1 Device topically to the appropriate area as directed., Disp: , Rfl:   •  doxazosin  (Cardura) 4 MG tablet, 1 daily (Patient taking differently: Take 4 mg by mouth Every Night. 1 daily), Disp: 90 tablet, Rfl: 2  •  fexofenadine (ALLEGRA) 180 MG tablet, TAKE ONE TABLET BY MOUTH DAILY, Disp: 30 tablet, Rfl: 3  •  glipizide (Glucotrol) 5 MG tablet, 1 q am (Patient taking differently: Take 5 mg by mouth 2 (Two) Times a Day Before Meals.), Disp: 30 tablet, Rfl: 0  •  losartan (COZAAR) 100 MG tablet, TAKE ONE TABLET BY MOUTH EVERY MORNING, Disp: 30 tablet, Rfl: 3  •  metFORMIN (GLUCOPHAGE) 500 MG tablet, Take 2 tablets by mouth 2 (Two) Times a Day With Meals., Disp: 360 tablet, Rfl: 2  •  pioglitazone (Actos) 15 MG tablet, 1 tablet every morning (Patient taking differently: 30 mg. 1 tablet every morning), Disp: 90 tablet, Rfl: 3  •  vitamin B-6 (PYRIDOXINE) 100 MG tablet, Take 100 mg by mouth Daily., Disp: , Rfl:   •  cyclobenzaprine (FLEXERIL) 10 MG tablet, Take 10 mg by mouth., Disp: , Rfl:   •  doxycycline (VIBRAMYCIN) 100 MG capsule, Take 1 capsule by mouth 2 (Two) Times a Day., Disp: 28 capsule, Rfl: 0     Review of System  Review of Systems   Constitutional: Negative.    Eyes: Negative.    Respiratory: Negative.    Cardiovascular: Negative.    Skin: Positive for rash.        Cellulitis left lower extremity from the mid calf down to the ankle     I have reviewed and confirmed the accuracy of the ROS as documented by the MA/LPN/RN Dexter Paredes MD    Vitals:    01/12/22 1146   BP: 130/90   Pulse: 54   Resp: 20   Temp: 96.6 °F (35.9 °C)   SpO2: 93%     Body mass index is 64.14 kg/m².    Objective     Physical Exam  Physical Exam  Constitutional:       Appearance: He is obese.   HENT:      Nose: Nose normal.   Eyes:      Extraocular Movements: Extraocular movements intact.      Pupils: Pupils are equal, round, and reactive to light.   Cardiovascular:      Rate and Rhythm: Normal rate and regular rhythm.      Pulses: Normal pulses.      Heart sounds: Normal heart sounds.   Musculoskeletal:          General: Tenderness present.      Cervical back: Normal range of motion.      Left lower leg: Edema present.   Skin:     Findings: Rash present.      Comments: Left lower extremity cellulitis from the calf down to the ankle   Neurological:      Mental Status: He is alert.         Assessment/Plan      This 66-year-old patient presents at this time with complaint of redness in his left lower extremity patient was seen in the emergency room on 12/11 for possible DVT because of pain in his right lower extremity after evaluation this was felt to represent just cellulitis of the right lower extremity and the patient was given doxycycline and amoxicillin.  He has similar cellulitis now in the left lower extremity that is moderately tender as well.  Note is made that the patient relates he is allergic to penicillin sulfa and codeine.    We impressed upon the patient the importance of wearing support hose and keeping the foot elevated.  We will also give him doxycycline 150 mg 1 tab p.o. twice daily and reevaluate in the next few weeks.          Diagnoses and all orders for this visit:    1. Cellulitis of left lower extremity without foot (Primary)    Other orders  -     doxycycline (VIBRAMYCIN) 100 MG capsule; Take 1 capsule by mouth 2 (Two) Times a Day.  Dispense: 28 capsule; Refill: 0      Plan:  1.)  Doxycycline 150 mg 1 tab p.o. twice daily  2.)  Reevaluate in 3 to 4 weeks.  3  3.)  Elevate the legs and wear support hose as instructed during the waking hours.       Dexter Paredes MD  01/12/2022

## 2022-02-01 RX ORDER — DOXYCYCLINE HYCLATE 100 MG/1
CAPSULE ORAL
Qty: 28 CAPSULE | Refills: 0 | Status: SHIPPED | OUTPATIENT
Start: 2022-02-01 | End: 2022-02-21 | Stop reason: SDUPTHER

## 2022-02-18 ENCOUNTER — OFFICE VISIT (OUTPATIENT)
Dept: FAMILY MEDICINE CLINIC | Facility: CLINIC | Age: 67
End: 2022-02-18

## 2022-02-18 VITALS
SYSTOLIC BLOOD PRESSURE: 120 MMHG | RESPIRATION RATE: 16 BRPM | BODY MASS INDEX: 45.1 KG/M2 | WEIGHT: 315 LBS | DIASTOLIC BLOOD PRESSURE: 78 MMHG | HEIGHT: 70 IN

## 2022-02-18 DIAGNOSIS — E66.01 MORBID OBESITY: ICD-10-CM

## 2022-02-18 DIAGNOSIS — L03.116 CELLULITIS OF LEFT LOWER EXTREMITY WITHOUT FOOT: Primary | ICD-10-CM

## 2022-02-18 PROBLEM — I27.20 PULMONARY HYPERTENSION, MODERATE TO SEVERE: Status: ACTIVE | Noted: 2022-01-26

## 2022-02-18 PROBLEM — G47.33 OSA (OBSTRUCTIVE SLEEP APNEA): Status: ACTIVE | Noted: 2022-01-26

## 2022-02-18 PROBLEM — R60.0 BILATERAL LOWER EXTREMITY EDEMA: Status: ACTIVE | Noted: 2022-01-25

## 2022-02-18 PROBLEM — R60.1 ANASARCA: Status: ACTIVE | Noted: 2022-01-25

## 2022-02-18 PROCEDURE — 99214 OFFICE O/P EST MOD 30 MIN: CPT | Performed by: INTERNAL MEDICINE

## 2022-02-18 RX ORDER — ATORVASTATIN CALCIUM 20 MG/1
20 TABLET, FILM COATED ORAL DAILY
COMMUNITY
End: 2022-04-11

## 2022-02-18 RX ORDER — FUROSEMIDE 40 MG/1
40 TABLET ORAL DAILY
COMMUNITY
Start: 2022-01-26 | End: 2022-02-25

## 2022-02-21 ENCOUNTER — TELEPHONE (OUTPATIENT)
Dept: FAMILY MEDICINE CLINIC | Facility: CLINIC | Age: 67
End: 2022-02-21

## 2022-02-21 DIAGNOSIS — L03.116 CELLULITIS OF LEFT LOWER EXTREMITY WITHOUT FOOT: ICD-10-CM

## 2022-02-21 DIAGNOSIS — M89.8X9 BONE PAIN: Primary | ICD-10-CM

## 2022-02-21 PROCEDURE — 99214 OFFICE O/P EST MOD 30 MIN: CPT | Performed by: INTERNAL MEDICINE

## 2022-02-21 RX ORDER — TRAMADOL HYDROCHLORIDE 50 MG/1
TABLET ORAL
Qty: 14 TABLET | Refills: 0 | Status: SHIPPED | OUTPATIENT
Start: 2022-02-21 | End: 2022-02-21

## 2022-02-21 RX ORDER — DOXYCYCLINE HYCLATE 50 MG/1
50 CAPSULE ORAL 2 TIMES DAILY
Qty: 60 CAPSULE | Refills: 0 | Status: SHIPPED | OUTPATIENT
Start: 2022-02-21 | End: 2022-02-22 | Stop reason: SDUPTHER

## 2022-02-21 RX ORDER — DOXYCYCLINE HYCLATE 100 MG/1
100 CAPSULE ORAL 2 TIMES DAILY
Qty: 28 CAPSULE | Refills: 0 | Status: SHIPPED | OUTPATIENT
Start: 2022-02-21 | End: 2022-02-21

## 2022-02-21 NOTE — TELEPHONE ENCOUNTER
Caller: Joseph Mcdonald    Relationship: Self    Best call back number:  [phone]     What medication are you requesting: PAIN IN LEGS     What are your current symptoms: PAIN IN LEGS     How long have you been experiencing symptoms:  3 MONTHS   Have you had these symptoms before:    [x] Yes  [] No    Have you been treated for these symptoms before:   [x] Yes  [] No    If a prescription is needed, what is your preferred pharmacy and phone number: [facility] [address] - [phone]  - [phone]     Additional notes: N/A

## 2022-02-21 NOTE — TELEPHONE ENCOUNTER
Caller: Joseph Mcdonlad     Relationship to Patient: SELF    Phone Number: [phone]     Reason for Call: PATIENT CALLED TO CHECK ON THE STATUS OF THIS MEDICATION REQUEST.

## 2022-02-22 ENCOUNTER — TELEPHONE (OUTPATIENT)
Dept: FAMILY MEDICINE CLINIC | Facility: CLINIC | Age: 67
End: 2022-02-22

## 2022-02-22 RX ORDER — DOXYCYCLINE HYCLATE 50 MG/1
CAPSULE ORAL
Qty: 60 CAPSULE | Refills: 0 | Status: SHIPPED | OUTPATIENT
Start: 2022-02-22 | End: 2022-03-07

## 2022-02-22 NOTE — PROGRESS NOTES
2022    CC: Cellulitis (f/u...no other issues) and Abdominal Pain (lower abdominal swelling and hardness)  .        HPI  History of Present Illness     Subjective   Joseph Mcdonald is a 66 y.o. male.      The following portions of the patient's history were reviewed and updated as appropriate: allergies, current medications, past family history, past medical history, past social history, past surgical history and problem list.    Problem List  Patient Active Problem List   Diagnosis   • Diabetes mellitus type 2, uncontrolled, with complications (HCC)   • HTN (hypertension)   • Hyperlipidemia   • LAP-BAND surgery status   • Morbid obesity (HCC)   • Morbid obesity with BMI of 50.0-59.9, adult (HCC)   • Health care maintenance   • Anasarca   • Bilateral lower extremity edema   • HERMINIA (obstructive sleep apnea)   • Pulmonary hypertension, moderate to severe (HCC)       Past Medical History  Past Medical History:   Diagnosis Date   • Diabetes mellitus (HCC)    • Hyperlipidemia    • Hypertension        Surgical History  History reviewed. No pertinent surgical history.    Family History  History reviewed. No pertinent family history.    Social History  Social History    Tobacco Use      Smoking status: Former Smoker        Packs/day: 0.50        Years: 10.00        Pack years: 5        Types: Cigarettes        Quit date:         Years since quittin.1      Smokeless tobacco: Never Used       Is the Patient a current tobacco user? No    Allergies  Allergies   Allergen Reactions   • Codeine Diarrhea and Nausea And Vomiting   • Penicillins Other (See Comments)   • Sulfa Antibiotics Other (See Comments)       Current Medications    Current Outpatient Medications:   •  atorvastatin (LIPITOR) 20 MG tablet, Take 20 mg by mouth Daily., Disp: , Rfl:   •  Continuous Blood Gluc Sensor (FreeStyle Missy 14 Day Sensor) misc, Apply 1 Device topically to the appropriate area as directed., Disp: , Rfl:   •  doxazosin (Cardura) 4  MG tablet, 1 daily (Patient taking differently: Take 4 mg by mouth Every Night. 1 daily), Disp: 90 tablet, Rfl: 2  •  fexofenadine (ALLEGRA) 180 MG tablet, TAKE ONE TABLET BY MOUTH DAILY, Disp: 30 tablet, Rfl: 3  •  furosemide (LASIX) 40 MG tablet, Take 40 mg by mouth Daily., Disp: , Rfl:   •  glipizide (Glucotrol) 5 MG tablet, 1 q am (Patient taking differently: Take 5 mg by mouth 2 (Two) Times a Day Before Meals.), Disp: 30 tablet, Rfl: 0  •  losartan (COZAAR) 100 MG tablet, TAKE ONE TABLET BY MOUTH EVERY MORNING, Disp: 30 tablet, Rfl: 3  •  metFORMIN (GLUCOPHAGE) 500 MG tablet, Take 2 tablets by mouth 2 (Two) Times a Day With Meals., Disp: 360 tablet, Rfl: 2  •  pioglitazone (Actos) 15 MG tablet, 1 tablet every morning (Patient taking differently: 30 mg. 1 tablet every morning), Disp: 90 tablet, Rfl: 3  •  vitamin B-6 (PYRIDOXINE) 100 MG tablet, Take 100 mg by mouth Daily., Disp: , Rfl:   •  doxycycline (VIBRAMYCIN) 50 MG capsule, Take 1 capsule by mouth 2 (Two) Times a Day. Take 3 tablets twice daily, Disp: 60 capsule, Rfl: 0     Review of System  Review of Systems   HENT: Negative.    Eyes: Negative.    Respiratory: Negative.    Cardiovascular: Negative.    Gastrointestinal: Negative.    Musculoskeletal:        Increased redness of the left tibia-fibula area.  Tenderness to palpation with 1+ edema and increased warmth.   Neurological: Negative.      I have reviewed and confirmed the accuracy of the ROS as documented by the MA/LPN/RN Dexter Paredes MD    Vitals:    02/18/22 1112   BP: 120/78   Resp: 16     Body mass index is 63.99 kg/m².    Objective     Physical Exam  Physical Exam  HENT:      Head: Normocephalic and atraumatic.      Right Ear: Tympanic membrane normal.      Nose: Nose normal.   Cardiovascular:      Rate and Rhythm: Regular rhythm.      Heart sounds: Normal heart sounds.   Pulmonary:      Breath sounds: Normal breath sounds.   Musculoskeletal:      Cervical back: Neck supple.   Skin:      General: Skin is warm and dry.      Findings: Erythema present.      Comments: Tenderness to palpation of the left lower extremity along the tibia-fibula area.  Also increased warmth and redness.  Past medical history of cellulitis.         Assessment/Plan      This 66-year-old patient presents today for follow-up of cellulitis in the left lower extremity.  He was started on doxycycline 150 mg 1 tab p.o. twice daily approximately a month ago.  He relates that the swelling and tenderness in the left lower extremity along with the redness decreased significantly but then reoccurred approximately a week ago.  He is allergic to penicillin and sulfa.    He complains of some mild pain in the left tibiofibular area.  The left lower extremity demonstrates increased warmth, redness, and mild tenderness to palpation.  Patient has a history of diabetes mellitus type 2 with  good control seen at his last visit with an A1c of 6.9.    I believe this is a recurrence of the cellulitis but because of his allergies to 2 usual agents we will restart him on the doxycycline and obtain consultation from infectious disease.    We note the patient's increased girth of 446 pounds.  We will increase his antibiotic to 150 mg p.o. twice daily.        Diagnoses and all orders for this visit:    1. Cellulitis of left lower extremity without foot (Primary)    2. Morbid obesity (HCC)    Other orders  -     doxycycline (VIBRAMYCIN) 50 MG capsule; Take 1 capsule by mouth 2 (Two) Times a Day. Take 3 tablets twice daily  Dispense: 60 capsule; Refill: 0      Plan:  1.)  Follow-up in the next few weeks.  2.)  Consultation to infectious disease.  3.)  Nuclear bone scan to rule out osteomyelitis  4.)       Dexter Paredes MD  02/18/2022

## 2022-02-22 NOTE — TELEPHONE ENCOUNTER
Medication clarification resolved.  We also notify the patient that we are obtaining consultation with infectious disease.

## 2022-02-27 DIAGNOSIS — E10.9 TYPE 1 DIABETES MELLITUS WITHOUT COMPLICATION: ICD-10-CM

## 2022-02-27 DIAGNOSIS — J30.1 ALLERGIC RHINITIS DUE TO POLLEN, UNSPECIFIED SEASONALITY: ICD-10-CM

## 2022-02-28 RX ORDER — FEXOFENADINE HCL 180 MG/1
TABLET ORAL
Qty: 30 TABLET | Refills: 3 | Status: SHIPPED | OUTPATIENT
Start: 2022-02-28 | End: 2022-06-29 | Stop reason: SDUPTHER

## 2022-02-28 RX ORDER — DOXAZOSIN MESYLATE 4 MG/1
4 TABLET ORAL NIGHTLY
Qty: 90 TABLET | Refills: 2 | Status: SHIPPED | OUTPATIENT
Start: 2022-02-28 | End: 2022-04-21

## 2022-02-28 NOTE — TELEPHONE ENCOUNTER
Rx Refill Note  Requested Prescriptions     Pending Prescriptions Disp Refills   • doxazosin (CARDURA) 4 MG tablet [Pharmacy Med Name: DOXAZOSIN MESYLATE 4 MG TAB] 90 tablet 2     Sig: TAKE ONE TABLET BY MOUTH DAILY   • fexofenadine (ALLEGRA) 180 MG tablet [Pharmacy Med Name: FEXOFENADINE  MG TABLET] 30 tablet 3     Sig: TAKE ONE TABLET BY MOUTH DAILY      Last office visit with prescribing clinician: 2/18/2022      Next office visit with prescribing clinician: 3/10/2022            Kieran Waldrop MA  02/28/22, 09:26 EST

## 2022-03-07 RX ORDER — DOXYCYCLINE HYCLATE 50 MG/1
CAPSULE ORAL
Qty: 60 CAPSULE | Refills: 0 | Status: SHIPPED | OUTPATIENT
Start: 2022-03-07 | End: 2022-04-21

## 2022-03-14 ENCOUNTER — OFFICE VISIT (OUTPATIENT)
Dept: FAMILY MEDICINE CLINIC | Facility: CLINIC | Age: 67
End: 2022-03-14

## 2022-03-14 VITALS
BODY MASS INDEX: 45.1 KG/M2 | DIASTOLIC BLOOD PRESSURE: 88 MMHG | RESPIRATION RATE: 16 BRPM | SYSTOLIC BLOOD PRESSURE: 138 MMHG | HEIGHT: 70 IN | WEIGHT: 315 LBS

## 2022-03-14 DIAGNOSIS — IMO0002 DIABETES MELLITUS TYPE 2, UNCONTROLLED, WITH COMPLICATIONS: Chronic | ICD-10-CM

## 2022-03-14 DIAGNOSIS — L03.115 CELLULITIS OF RIGHT LOWER EXTREMITY: Primary | Chronic | ICD-10-CM

## 2022-03-14 PROCEDURE — 99214 OFFICE O/P EST MOD 30 MIN: CPT | Performed by: INTERNAL MEDICINE

## 2022-03-14 NOTE — PROGRESS NOTES
2022    CC: CELLULITIS (F/U..No other issues)  .        HPI  Wound Infection  This is a chronic problem. The current episode started 1 to 4 weeks ago. The problem occurs constantly. The problem has been gradually improving. Associated symptoms include a rash. Pertinent negatives include no coughing or fever. The symptoms are aggravated by walking. The treatment provided mild relief.        Subjective   Joseph Mcdonald is a 66 y.o. male.      The following portions of the patient's history were reviewed and updated as appropriate: allergies, current medications, past family history, past medical history, past social history, past surgical history and problem list.    Problem List  Patient Active Problem List   Diagnosis   • Diabetes mellitus type 2, uncontrolled, with complications (HCC)   • HTN (hypertension)   • Hyperlipidemia   • LAP-BAND surgery status   • Morbid obesity (HCC)   • Morbid obesity with BMI of 50.0-59.9, adult (HCC)   • Health care maintenance   • Anasarca   • Bilateral lower extremity edema   • HERMINIA (obstructive sleep apnea)   • Pulmonary hypertension, moderate to severe (HCC)       Past Medical History  Past Medical History:   Diagnosis Date   • Diabetes mellitus (HCC)    • Hyperlipidemia    • Hypertension        Surgical History  History reviewed. No pertinent surgical history.    Family History  History reviewed. No pertinent family history.    Social History  Social History    Tobacco Use      Smoking status: Former Smoker        Packs/day: 0.50        Years: 10.00        Pack years: 5        Types: Cigarettes        Quit date:         Years since quittin.2      Smokeless tobacco: Never Used       Is the Patient a current tobacco user? No    Allergies  Allergies   Allergen Reactions   • Codeine Diarrhea and Nausea And Vomiting   • Penicillins Other (See Comments)   • Sulfa Antibiotics Other (See Comments)       Current Medications    Current Outpatient Medications:   •   atorvastatin (LIPITOR) 20 MG tablet, Take 20 mg by mouth Daily., Disp: , Rfl:   •  Continuous Blood Gluc Sensor (FreeStyle Missy 14 Day Sensor) misc, Apply 1 Device topically to the appropriate area as directed., Disp: , Rfl:   •  doxazosin (CARDURA) 4 MG tablet, Take 1 tablet by mouth Every Night. 1 daily, Disp: 90 tablet, Rfl: 2  •  doxycycline (VIBRAMYCIN) 50 MG capsule, TAKE THREE CAPSULES BY MOUTH TWICE A DAY, Disp: 60 capsule, Rfl: 0  •  fexofenadine (ALLEGRA) 180 MG tablet, TAKE ONE TABLET BY MOUTH DAILY, Disp: 30 tablet, Rfl: 3  •  glipizide (Glucotrol) 5 MG tablet, 1 q am (Patient taking differently: Take 5 mg by mouth 2 (Two) Times a Day Before Meals.), Disp: 30 tablet, Rfl: 0  •  losartan (COZAAR) 100 MG tablet, TAKE ONE TABLET BY MOUTH EVERY MORNING, Disp: 30 tablet, Rfl: 3  •  metFORMIN (GLUCOPHAGE) 500 MG tablet, Take 2 tablets by mouth 2 (Two) Times a Day With Meals., Disp: 360 tablet, Rfl: 2  •  pioglitazone (Actos) 15 MG tablet, 1 tablet every morning (Patient taking differently: 15 mg. 1 tablet every morning), Disp: 90 tablet, Rfl: 3  •  vitamin B-6 (PYRIDOXINE) 100 MG tablet, Take 100 mg by mouth Daily., Disp: , Rfl:   •  triamcinolone (KENALOG) 0.1 % ointment, Apply 1 application topically to the appropriate area as directed 2 (Two) Times a Day., Disp: 30 g, Rfl: 0     Review of System  Review of Systems   Constitutional: Negative for fever.   Eyes: Negative.    Respiratory: Negative for cough.    Cardiovascular: Negative.    Skin: Positive for dry skin and rash.     I have reviewed and confirmed the accuracy of the ROS as documented by the MA/LPN/RN Dexter Paredes MD    Vitals:    03/14/22 1506   BP: 138/88   Resp: 16     Body mass index is 63.85 kg/m².    Objective     Physical Exam  Physical Exam  Constitutional:       Appearance: Normal appearance.   HENT:      Head: Normocephalic and atraumatic.      Nose: Nose normal.      Mouth/Throat:      Mouth: Mucous membranes are moist.   Eyes:       Extraocular Movements: Extraocular movements intact.      Pupils: Pupils are equal, round, and reactive to light.   Cardiovascular:      Pulses: Normal pulses.      Heart sounds: Normal heart sounds.   Pulmonary:      Effort: Pulmonary effort is normal.      Breath sounds: Normal breath sounds.   Abdominal:      General: Abdomen is flat.      Palpations: Abdomen is soft.   Neurological:      Mental Status: He is alert.         Assessment/Plan    This 66-year-old patient presents today for follow-up of right lower extremity cellulitis.  He relates he feels a little better but still feels much pain in the right lower extremity.  He is currently on doxycycline.  His BMI is 63.9.  His blood pressure was controlled today at 138/88 in the left arm sitting position large cuff.  The cellulitis is improved.  The right lower extremity is not as tender there is only trace edema but there is increased warmth.  Patient is scheduled to be seen by infectious disease on Wednesday and is scheduled for bone scan on Wednesday as well.  Note is made patient is allergic to penicillin and sulfa antibiotics.  He is intolerant of Toradol.  Patient relates that regarding pain control it is not as bad as it was in the past and he feels that Tylenol extra strength is enough for now for the pain.    Patient's hemoglobin A1c is improved from 7.3-6.9 with his last visit.          Diagnoses and all orders for this visit:    1. Cellulitis of right lower extremity (Primary)  Comments:  Cellulitis    2. Diabetes mellitus type 2, uncontrolled, with complications (HCC)  Comments:  Controlled      Plan:  1.)  Await input from infectious disease  2.)  Follow-up in the next several weeks.       Dexter Paredes MD  03/14/2022

## 2022-03-16 ENCOUNTER — OFFICE VISIT (OUTPATIENT)
Dept: INFECTIOUS DISEASES | Facility: CLINIC | Age: 67
End: 2022-03-16

## 2022-03-16 VITALS
DIASTOLIC BLOOD PRESSURE: 85 MMHG | RESPIRATION RATE: 18 BRPM | BODY MASS INDEX: 45.1 KG/M2 | TEMPERATURE: 97.9 F | SYSTOLIC BLOOD PRESSURE: 155 MMHG | HEIGHT: 70 IN | HEART RATE: 112 BPM | WEIGHT: 315 LBS

## 2022-03-16 DIAGNOSIS — E66.01 MORBID OBESITY WITH BMI OF 60.0-69.9, ADULT: ICD-10-CM

## 2022-03-16 DIAGNOSIS — I89.0 LYMPHEDEMA: ICD-10-CM

## 2022-03-16 DIAGNOSIS — I87.2 VENOUS STASIS DERMATITIS OF BOTH LOWER EXTREMITIES: Primary | ICD-10-CM

## 2022-03-16 PROCEDURE — 99204 OFFICE O/P NEW MOD 45 MIN: CPT | Performed by: INTERNAL MEDICINE

## 2022-03-16 NOTE — PROGRESS NOTES
"cc: Here for evaluation of cellulitis    Patient reports that he was in his usual state health until November 2021 when he developed right lower extremity swelling and erythema that then spread to the left lower extremity.  There is no associated fevers but he noticed intense pruritus, neuropathic pain.  He has been on multiple rounds of doxycycline without relief.  His primary care doctor was concerned for potential osteomyelitis so bone scan was ordered for March 8 but the patient got confused and did not show up for this.  He weighs over 400 pounds and has a history of LAP-BAND but reports that his weight is largely been stable.  He is diabetic on oral agents alone and most recent A1c was actually very well controlled with an A1c of 6.9.    Past medical history: Hypertension, hyperlipidemia, diabetes mellitus type 2, LAP-BAND, lymphedema with venous stasis dermatitis  No family history of infectious diseases  Social history: He is single and lives here in [place], [place].  Ex-smoker having quit about 35 years ago.    Review of Systems: All other reviewed and negative except as per HPI    Blood pressure 155/85, pulse 112, temperature 97.9 °F (36.6 °C), resp. rate 18, height 177.8 cm (70\"), weight (!) 201 kg (443 lb).  GENERAL: Awake and alert, in no acute distress.   HEENT: Oropharynx is clear. Hearing is grossly normal.   SKIN: Warm and dry with bilateral scaly erythema with excoriation faust  PSYCHIATRIC: Appropriate mood, affect, insight, and judgment.       DIAGNOSTICS:  Labs from January 2022:  --CBC shows a white count of 7, hemoglobin of 12, platelets of 215  --Hemoglobin A1c 6.9  --CMP normal except for CO2 35, glucose 143; creatinine was 0.65    Assessment and Plan  Venous stasis dermatitis of both lower extremities  Lymphedema  Morbid obesity with BMI of 60.0-69.9, adult (HCC)    Infectious cellulitis should basically never be bilateral and given the lack of fever, plus long chronicity, I think this " is venous stasis dermatitis and not an infectious problem.  We will discontinue doxycycline.  Start high potency triamcinolone 0.1% ointment twice daily x1 week to the bilateral lower extremities.  No need for additional imaging from my standpoint.  We discussed means to prevent recurrent venous stasis dermatitis including control of the lymphedema mainly by weight loss and compression hose.  If this is not successful, then I think a referral to the lymphedema clinic might be indicated.

## 2022-03-18 ENCOUNTER — PATIENT ROUNDING (BHMG ONLY) (OUTPATIENT)
Dept: INFECTIOUS DISEASES | Facility: CLINIC | Age: 67
End: 2022-03-18

## 2022-03-18 NOTE — PROGRESS NOTES
March 18, 2022        I am  with [facility]  [address] [address] [phone].         We're always looking for ways to make our patients' experiences even better. Do you have recommendations on ways we may improve?  no    Overall were you satisfied with your first visit to our practice? yes       I appreciate you taking the time to speak with me today. Is there anything else I can do for you? no      Thank you, and have a great day.

## 2022-04-11 RX ORDER — ATORVASTATIN CALCIUM 20 MG/1
TABLET, FILM COATED ORAL
Qty: 90 TABLET | Refills: 1 | Status: SHIPPED | OUTPATIENT
Start: 2022-04-11 | End: 2022-06-29 | Stop reason: SDUPTHER

## 2022-04-14 ENCOUNTER — TELEPHONE (OUTPATIENT)
Dept: FAMILY MEDICINE CLINIC | Facility: CLINIC | Age: 67
End: 2022-04-14

## 2022-04-14 NOTE — TELEPHONE ENCOUNTER
Caller: Joseph Mcdonald    Relationship: Self    Best call back number: [phone]    What medication are you requesting: NITZA    What are your current symptoms: SINUS DRAINAGE     How long have you been experiencing symptoms:  4 DAYS    Have you had these symptoms before:    [x] Yes  [] No    Have you been treated for these symptoms before:   [x] Yes  [] No    If a prescription is needed, what is your preferred pharmacy and phone number:  [facility] [address] - [phone]  - [phone] FX

## 2022-04-21 ENCOUNTER — OFFICE VISIT (OUTPATIENT)
Dept: FAMILY MEDICINE CLINIC | Facility: CLINIC | Age: 67
End: 2022-04-21

## 2022-04-21 ENCOUNTER — HOSPITAL ENCOUNTER (OUTPATIENT)
Dept: GENERAL RADIOLOGY | Facility: HOSPITAL | Age: 67
Discharge: HOME | End: 2022-04-21
Admitting: INTERNAL MEDICINE

## 2022-04-21 VITALS
RESPIRATION RATE: 16 BRPM | HEIGHT: 70 IN | DIASTOLIC BLOOD PRESSURE: 100 MMHG | SYSTOLIC BLOOD PRESSURE: 140 MMHG | BODY MASS INDEX: 63.56 KG/M2

## 2022-04-21 DIAGNOSIS — J40 BRONCHITIS: ICD-10-CM

## 2022-04-21 DIAGNOSIS — R06.02 SHORTNESS OF BREATH: ICD-10-CM

## 2022-04-21 DIAGNOSIS — Z91.199 NONCOMPLIANCE: Chronic | ICD-10-CM

## 2022-04-21 DIAGNOSIS — E66.01 MORBID OBESITY WITH BMI OF 50.0-59.9, ADULT: Chronic | ICD-10-CM

## 2022-04-21 DIAGNOSIS — E10.9 TYPE 1 DIABETES MELLITUS WITHOUT COMPLICATION: Chronic | ICD-10-CM

## 2022-04-21 DIAGNOSIS — R06.02 SHORTNESS OF BREATH: Primary | Chronic | ICD-10-CM

## 2022-04-21 PROCEDURE — 71046 X-RAY EXAM CHEST 2 VIEWS: CPT

## 2022-04-21 PROCEDURE — 99214 OFFICE O/P EST MOD 30 MIN: CPT | Performed by: INTERNAL MEDICINE

## 2022-04-21 RX ORDER — FUROSEMIDE 40 MG/1
40 TABLET ORAL 2 TIMES DAILY
Qty: 40 TABLET | Refills: 1 | Status: SHIPPED | OUTPATIENT
Start: 2022-04-21 | End: 2022-06-13

## 2022-04-22 ENCOUNTER — TELEPHONE (OUTPATIENT)
Dept: FAMILY MEDICINE CLINIC | Facility: CLINIC | Age: 67
End: 2022-04-22

## 2022-04-22 DIAGNOSIS — R06.02 SHORTNESS OF BREATH: Primary | ICD-10-CM

## 2022-04-22 LAB — BNP SERPL-MCNC: 41.9 PG/ML (ref 0–100)

## 2022-04-22 NOTE — TELEPHONE ENCOUNTER
Patient informed    Inform patient that his chest x-ray could not show us the detail that we needed.  We need a CT scan to examine his lungs better.  The CT has been ordered.  We also need to get a blood test to check on his kidneys.  That has been ordered.  He can stop by the lab to get the blood test done.  The hospital will call him regarding scheduling for the CT scan

## 2022-04-22 NOTE — TELEPHONE ENCOUNTER
PATIENT CALLED TO LET DR ROGERS KNOW THAT LASIX HAS WORKED VERY WELL  NO NEED FOR A CALL BACK UNLESS DOCTOR WANTS TO SPEAK WITH HIM

## 2022-04-25 NOTE — PROGRESS NOTES
2022    CC: Cellulitis (F/U..No other issues)  .        HPI  This 66-year-old patient presents today for follow-up of cellulitis.  This problem has resolved.  He has no cellulitis of the lower extremity.  Patient does have a have extreme shortness of breath.  Note is made that he is supposed to be using supplemental O2 but he did not bring it with him today.       Subjective   Joseph Mcdonald is a 66 y.o. male.      The following portions of the patient's history were reviewed and updated as appropriate: allergies, current medications, past family history, past medical history, past social history, past surgical history and problem list.    Problem List  Patient Active Problem List   Diagnosis   • Diabetes mellitus type 2, uncontrolled, with complications   • HTN (hypertension)   • Hyperlipidemia   • LAP-BAND surgery status   • Morbid obesity (HCC)   • Morbid obesity with BMI of 50.0-59.9, adult (HCC)   • Health care maintenance   • Anasarca   • Bilateral lower extremity edema   • HERMINIA (obstructive sleep apnea)   • Pulmonary hypertension, moderate to severe (HCC)       Past Medical History  Past Medical History:   Diagnosis Date   • Diabetes mellitus (HCC)    • Hyperlipidemia    • Hypertension        Surgical History  History reviewed. No pertinent surgical history.    Family History  History reviewed. No pertinent family history.    Social History  Social History    Tobacco Use      Smoking status: Former Smoker        Packs/day: 0.50        Years: 10.00        Pack years: 5        Types: Cigarettes        Quit date:         Years since quittin.3      Smokeless tobacco: Never Used       Is the Patient a current tobacco user? No    Allergies  Allergies   Allergen Reactions   • Codeine Diarrhea and Nausea And Vomiting   • Penicillins Other (See Comments)   • Sulfa Antibiotics Other (See Comments)       Current Medications    Current Outpatient Medications:   •  atorvastatin (LIPITOR) 20 MG tablet, TAKE  ONE TABLET BY MOUTH DAILY, Disp: 90 tablet, Rfl: 1  •  Continuous Blood Gluc Sensor (FreeStyle Missy 14 Day Sensor) misc, Apply 1 Device topically to the appropriate area as directed., Disp: , Rfl:   •  fexofenadine (ALLEGRA) 180 MG tablet, TAKE ONE TABLET BY MOUTH DAILY, Disp: 30 tablet, Rfl: 3  •  glipizide (Glucotrol) 5 MG tablet, 1 q am (Patient taking differently: Take 5 mg by mouth 2 (Two) Times a Day Before Meals.), Disp: 30 tablet, Rfl: 0  •  losartan (COZAAR) 100 MG tablet, TAKE ONE TABLET BY MOUTH EVERY MORNING, Disp: 30 tablet, Rfl: 3  •  metFORMIN (GLUCOPHAGE) 500 MG tablet, Take 2 tablets by mouth 2 (Two) Times a Day With Meals., Disp: 360 tablet, Rfl: 2  •  pioglitazone (Actos) 15 MG tablet, 1 tablet every morning (Patient taking differently: 15 mg. 1 tablet every morning), Disp: 90 tablet, Rfl: 3  •  triamcinolone (KENALOG) 0.1 % ointment, Apply 1 application topically to the appropriate area as directed 2 (Two) Times a Day., Disp: 30 g, Rfl: 0  •  vitamin B-6 (PYRIDOXINE) 100 MG tablet, Take 100 mg by mouth Daily., Disp: , Rfl:   •  furosemide (Lasix) 40 MG tablet, Take 1 tablet by mouth 2 (Two) Times a Day., Disp: 40 tablet, Rfl: 1     Review of System  Review of Systems   Constitutional: Negative.    Eyes: Negative.    Respiratory: Positive for shortness of breath and wheezing.    Endocrine: Negative.    Musculoskeletal: Negative.    Neurological: Negative.      I have reviewed and confirmed the accuracy of the ROS as documented by the MA/LPN/RN Dexter Paredes MD    Vitals:    04/21/22 1437   BP: 140/100   Resp: 16     Body mass index is 63.56 kg/m².    Objective     Physical Exam  Physical Exam  Constitutional:       Appearance: He is obese.   HENT:      Head: Normocephalic.   Cardiovascular:      Pulses: Normal pulses.      Heart sounds: Normal heart sounds.   Pulmonary:      Breath sounds: Wheezing present.   Musculoskeletal:         General: Normal range of motion.   Skin:     General: Skin  is warm and dry.   Neurological:      Mental Status: He is alert.         Assessment/Plan    This 66-year-old patient presents today initially for evaluation of cellulitis.  He was last seen by us about 1 month ago.  In the interim of visits the cellulitis has resolved.  But the patient does have extreme shortness of breath.  He has a long history of shortness of breath.  He has a oxygen generator at home plus supplemental oxygen to be used with ambulation but he does not carry it.  Indeed he has not had with him today.  His O2 saturation on room air was 86% with a pulse of 86.  Patient has wheezing and wet rales.  We advised the patient that he needed emergency room evaluation and treatment however he opted not to do so.  He relates that he would rather get treatment with his oxygen at home as when he is on oxygen he feels much better.  I have also indicated to him that I am concerned about possible CHF given his dependent edema and his wet rales  the patient still declined ER visitation.  Patient denies chest pain but shortness of breath when he does not have his O2.  Significantly his BMI is greater than 64.    With the patient's declining to go to the ER we will get a chest x-ray PA and lateral and start the patient on p.o. Lasix. we will also get a BNP.  He will give us a call tomorrow regarding his progress.  He also relates that he will use his O2 2 to 3 L/min as ordered.      Note is also made that the patient is scheduled for an upcoming visit with his endocrinologist Dr. Messina in the next few days.  Patient's last hemoglobin A1c 2 months ago was 6.9        Diagnoses and all orders for this visit:    1. Shortness of breath (Primary)  -     BNP ([facility] Only)  -     XR Chest PA & Lateral; Future    2. Type 1 diabetes mellitus without complication (HCC)    3. Pulmonary hypertension, moderate to severe (HCC)    4. Noncompliance    Other orders  -     furosemide (Lasix) 40 MG tablet; Take 1 tablet by mouth 2  (Two) Times a Day.  Dispense: 40 tablet; Refill: 1      Plan:  1.)  Patient declined ER evaluation  2.)  Lasix 40 mg 1 tab p.o. twice daily he is to take 1 tablet now or soon as he gets the prescription and then another later this afternoon then 1 tablet twice a day.  3.)  BNP to evaluate for CHF  4.)  Chest x-ray PA and lateral       Dexter Paredes MD  04/21/2022

## 2022-06-13 RX ORDER — FUROSEMIDE 40 MG/1
TABLET ORAL
Qty: 40 TABLET | Refills: 1 | Status: SHIPPED | OUTPATIENT
Start: 2022-06-13 | End: 2022-06-29 | Stop reason: SDUPTHER

## 2022-06-13 NOTE — TELEPHONE ENCOUNTER
Rx Refill Note  Requested Prescriptions     Pending Prescriptions Disp Refills   • furosemide (LASIX) 40 MG tablet [Pharmacy Med Name: FUROSEMIDE 40 MG TABLET] 40 tablet 1     Sig: TAKE ONE TABLET BY MOUTH TWICE A DAY      Last office visit with prescribing clinician: 4/21/2022      Next office visit with prescribing clinician: Visit date not found            Valencia Orozco MA  06/13/22, 08:41 EDT

## 2022-06-14 ENCOUNTER — HOSPITAL ENCOUNTER (OUTPATIENT)
Dept: CT IMAGING | Facility: HOSPITAL | Age: 67
Discharge: HOME | End: 2022-06-14
Admitting: INTERNAL MEDICINE

## 2022-06-14 DIAGNOSIS — R06.02 SHORTNESS OF BREATH: ICD-10-CM

## 2022-06-14 DIAGNOSIS — I27.20 PULMONARY HYPERTENSION: Primary | ICD-10-CM

## 2022-06-14 LAB — CREAT BLDA-MCNC: 0.8 MG/DL (ref 0.6–1.3)

## 2022-06-14 PROCEDURE — 25010000002 IOPAMIDOL 61 % SOLUTION: Performed by: INTERNAL MEDICINE

## 2022-06-14 PROCEDURE — 82565 ASSAY OF CREATININE: CPT

## 2022-06-14 PROCEDURE — 71260 CT THORAX DX C+: CPT

## 2022-06-14 RX ADMIN — IOPAMIDOL 98 ML: 612 INJECTION, SOLUTION INTRAVENOUS at 13:45

## 2022-06-15 ENCOUNTER — TELEPHONE (OUTPATIENT)
Dept: FAMILY MEDICINE CLINIC | Facility: CLINIC | Age: 67
End: 2022-06-15

## 2022-06-15 NOTE — TELEPHONE ENCOUNTER
PATIENTS SISTER STATES THEY WILL NOW BE GETTING PRESCRIPTIONS PICKED UP FROM:    [facility] [place], [place] - [address] - [phone]  - [phone] FX  [phone]    CALL BACK [phone]

## 2022-06-29 DIAGNOSIS — I10 HYPERTENSION, UNSPECIFIED TYPE: ICD-10-CM

## 2022-06-29 DIAGNOSIS — J30.1 ALLERGIC RHINITIS DUE TO POLLEN, UNSPECIFIED SEASONALITY: ICD-10-CM

## 2022-06-29 RX ORDER — FEXOFENADINE HCL 180 MG/1
180 TABLET ORAL DAILY
Qty: 30 TABLET | Refills: 3 | Status: SHIPPED | OUTPATIENT
Start: 2022-06-29 | End: 2023-03-30

## 2022-06-29 RX ORDER — ATORVASTATIN CALCIUM 20 MG/1
20 TABLET, FILM COATED ORAL DAILY
Qty: 90 TABLET | Refills: 1 | Status: SHIPPED | OUTPATIENT
Start: 2022-06-29

## 2022-06-29 RX ORDER — FUROSEMIDE 40 MG/1
40 TABLET ORAL 2 TIMES DAILY
Qty: 40 TABLET | Refills: 1 | Status: SHIPPED | OUTPATIENT
Start: 2022-06-29 | End: 2022-08-15

## 2022-06-29 RX ORDER — LOSARTAN POTASSIUM 100 MG/1
100 TABLET ORAL EVERY MORNING
Qty: 30 TABLET | Refills: 3 | Status: SHIPPED | OUTPATIENT
Start: 2022-06-29 | End: 2023-03-17

## 2022-06-29 NOTE — TELEPHONE ENCOUNTER
Caller: Joseph Mcdonald    Relationship: Self    Best call back number: [phone] (H)    Requested Prescriptions:   Requested Prescriptions     Pending Prescriptions Disp Refills   • losartan (COZAAR) 100 MG tablet 30 tablet 3     Sig: Take 1 tablet by mouth Every Morning.   • fexofenadine (ALLEGRA) 180 MG tablet 30 tablet 3     Sig: Take 1 tablet by mouth Daily.   • furosemide (LASIX) 40 MG tablet 40 tablet 1     Sig: Take 1 tablet by mouth 2 (Two) Times a Day.   • atorvastatin (LIPITOR) 20 MG tablet 90 tablet 1     Sig: Take 1 tablet by mouth Daily.   DOXAZOSIN 4MG NOT IN MED LIST BUT PATIENT STATES HE WAS GIVEN BY DR ROGERS     Pharmacy where request should be sent: [address] [place] [address] [address]  - [phone]  - [phone] FX     Additional details provided by patient: PATIENT NEEDS SENT TO NEW PHARMACY ASAP    Does the patient have less than a 3 day supply:  [x] Yes  [] No    Lul Thompson   06/29/22 13:27 EDT

## 2022-06-29 NOTE — TELEPHONE ENCOUNTER
Rx Refill Note  Requested Prescriptions     Pending Prescriptions Disp Refills   • losartan (COZAAR) 100 MG tablet 30 tablet 3     Sig: Take 1 tablet by mouth Every Morning.   • fexofenadine (ALLEGRA) 180 MG tablet 30 tablet 3     Sig: Take 1 tablet by mouth Daily.   • furosemide (LASIX) 40 MG tablet 40 tablet 1     Sig: Take 1 tablet by mouth 2 (Two) Times a Day.   • atorvastatin (LIPITOR) 20 MG tablet 90 tablet 1     Sig: Take 1 tablet by mouth Daily.      Last office visit with prescribing clinician: 4/21/2022      Next office visit with prescribing clinician: Visit date not found            Kieran Waldrop MA  06/29/22, 13:37 EDT

## 2022-07-27 ENCOUNTER — TELEPHONE (OUTPATIENT)
Dept: FAMILY MEDICINE CLINIC | Facility: CLINIC | Age: 67
End: 2022-07-27

## 2022-07-27 NOTE — TELEPHONE ENCOUNTER
DELETE AFTER REVIEWING: Telephone encounter to be sent to the clinical pool.    Pharmacy Name:  [facility]    Pharmacy representative name: SERENA    Pharmacy representative phone number: [phone]    What medication are you calling in regards to: DOXYCYCLINE 50 MG    What question does the pharmacy have: WHICH DOSAGE DO THEY USE,  1 TWICE A DAY OR 3 TABLETS TWICE A DAY    Who is the provider that prescribed the medication: SALOMON ROGERS MD    Additional notes: PLEASE CALL TO CLARIFY

## 2022-07-28 ENCOUNTER — TELEPHONE (OUTPATIENT)
Dept: FAMILY MEDICINE CLINIC | Facility: CLINIC | Age: 67
End: 2022-07-28

## 2022-07-28 NOTE — TELEPHONE ENCOUNTER
Caller: [facility] [place] [id number]  [place], [place] - [address]  - [phone]  - [phone]     Relationship: Pharmacy    Best call back number: [phone]    What medications are you currently taking:   Current Outpatient Medications on File Prior to Visit   Medication Sig Dispense Refill   • atorvastatin (LIPITOR) 20 MG tablet Take 1 tablet by mouth Daily. 90 tablet 1   • Continuous Blood Gluc Sensor (FreeStyle Missy 14 Day Sensor) misc Apply 1 Device topically to the appropriate area as directed.     • fexofenadine (ALLEGRA) 180 MG tablet Take 1 tablet by mouth Daily. 30 tablet 3   • furosemide (LASIX) 40 MG tablet Take 1 tablet by mouth 2 (Two) Times a Day. 40 tablet 1   • glipizide (Glucotrol) 5 MG tablet 1 q am (Patient taking differently: Take 5 mg by mouth 2 (Two) Times a Day Before Meals.) 30 tablet 0   • losartan (COZAAR) 100 MG tablet Take 1 tablet by mouth Every Morning. 30 tablet 3   • metFORMIN (GLUCOPHAGE) 500 MG tablet Take 2 tablets by mouth 2 (Two) Times a Day With Meals. 360 tablet 2   • pioglitazone (Actos) 15 MG tablet 1 tablet every morning (Patient taking differently: 15 mg. 1 tablet every morning) 90 tablet 3   • triamcinolone (KENALOG) 0.1 % ointment Apply 1 application topically to the appropriate area as directed 2 (Two) Times a Day. 30 g 0   • vitamin B-6 (PYRIDOXINE) 100 MG tablet Take 100 mg by mouth Daily.       No current facility-administered medications on file prior to visit.          When did you start taking these medications: N/A    Which medication are you concerned about: DOXYCYCLINE    Who prescribed you this medication: DR ROGERS    What are your concerns: VALERIA STATES THAT THE PRESCRIPTION IS OLD FROM 2/21/22.HE ALSO STATES THAT THE INSTRUCTIONS SAY TAKE ONE CAPSULE TWO TIME S DAILY BUT IT UNDERNEATH IT ALSO SAYS TAKE THREE CAPSULES TWICE A DAY.VALERIA IS REQUESTING TO KNOW IF THE PATIENT EVEN STILL NEEDS TO BE ON THESE AND WHAT IS CORRECT  DOSAGE.

## 2022-08-15 RX ORDER — FUROSEMIDE 40 MG/1
TABLET ORAL
Qty: 40 TABLET | Refills: 1 | Status: SHIPPED | OUTPATIENT
Start: 2022-08-15 | End: 2022-10-25

## 2022-09-02 DIAGNOSIS — I27.20 PULMONARY HTN: Primary | ICD-10-CM

## 2022-09-09 ENCOUNTER — TRANSCRIBE ORDERS (OUTPATIENT)
Dept: CARDIOLOGY | Facility: CLINIC | Age: 67
End: 2022-09-09

## 2022-09-09 DIAGNOSIS — Z01.810 PRE-OPERATIVE CARDIOVASCULAR EXAMINATION: Primary | ICD-10-CM

## 2022-09-09 DIAGNOSIS — Z13.6 SCREENING FOR ISCHEMIC HEART DISEASE: ICD-10-CM

## 2022-09-12 ENCOUNTER — LAB (OUTPATIENT)
Dept: LAB | Facility: HOSPITAL | Age: 67
End: 2022-09-12

## 2022-09-12 ENCOUNTER — HOSPITAL ENCOUNTER (OUTPATIENT)
Facility: HOSPITAL | Age: 67
Setting detail: HOSPITAL OUTPATIENT SURGERY
Discharge: HOME | End: 2022-09-12
Attending: INTERNAL MEDICINE | Admitting: INTERNAL MEDICINE

## 2022-09-12 VITALS
RESPIRATION RATE: 20 BRPM | WEIGHT: 315 LBS | BODY MASS INDEX: 45.1 KG/M2 | DIASTOLIC BLOOD PRESSURE: 71 MMHG | HEART RATE: 93 BPM | SYSTOLIC BLOOD PRESSURE: 149 MMHG | OXYGEN SATURATION: 91 % | TEMPERATURE: 98.1 F | HEIGHT: 70 IN

## 2022-09-12 DIAGNOSIS — Z13.6 SCREENING FOR ISCHEMIC HEART DISEASE: ICD-10-CM

## 2022-09-12 DIAGNOSIS — I27.20 PULMONARY HTN: ICD-10-CM

## 2022-09-12 DIAGNOSIS — Z01.810 PRE-OPERATIVE CARDIOVASCULAR EXAMINATION: ICD-10-CM

## 2022-09-12 LAB
ANION GAP SERPL CALCULATED.3IONS-SCNC: 7.5 MMOL/L (ref 5–15)
BASOPHILS # BLD AUTO: 0.06 10*3/MM3 (ref 0–0.2)
BASOPHILS NFR BLD AUTO: 0.7 % (ref 0–1.5)
BUN SERPL-MCNC: 14 MG/DL (ref 8–23)
BUN/CREAT SERPL: 19.2 (ref 7–25)
CALCIUM SPEC-SCNC: 9.2 MG/DL (ref 8.6–10.5)
CHLORIDE SERPL-SCNC: 93 MMOL/L (ref 98–107)
CO2 SERPL-SCNC: 37.5 MMOL/L (ref 22–29)
CREAT SERPL-MCNC: 0.73 MG/DL (ref 0.76–1.27)
DEPRECATED RDW RBC AUTO: 44.7 FL (ref 37–54)
EGFRCR SERPLBLD CKD-EPI 2021: 101.6 ML/MIN/1.73
EOSINOPHIL # BLD AUTO: 0.16 10*3/MM3 (ref 0–0.4)
EOSINOPHIL NFR BLD AUTO: 1.8 % (ref 0.3–6.2)
ERYTHROCYTE [DISTWIDTH] IN BLOOD BY AUTOMATED COUNT: 13 % (ref 12.3–15.4)
GLUCOSE BLDC GLUCOMTR-MCNC: 159 MG/DL (ref 70–130)
GLUCOSE SERPL-MCNC: 180 MG/DL (ref 65–99)
HCT VFR BLD AUTO: 38.1 % (ref 37.5–51)
HGB BLD-MCNC: 12.1 G/DL (ref 13–17.7)
IMM GRANULOCYTES # BLD AUTO: 0.03 10*3/MM3 (ref 0–0.05)
IMM GRANULOCYTES NFR BLD AUTO: 0.3 % (ref 0–0.5)
LYMPHOCYTES # BLD AUTO: 1.54 10*3/MM3 (ref 0.7–3.1)
LYMPHOCYTES NFR BLD AUTO: 17.6 % (ref 19.6–45.3)
MCH RBC QN AUTO: 30 PG (ref 26.6–33)
MCHC RBC AUTO-ENTMCNC: 31.8 G/DL (ref 31.5–35.7)
MCV RBC AUTO: 94.5 FL (ref 79–97)
MONOCYTES # BLD AUTO: 0.86 10*3/MM3 (ref 0.1–0.9)
MONOCYTES NFR BLD AUTO: 9.8 % (ref 5–12)
NEUTROPHILS NFR BLD AUTO: 6.12 10*3/MM3 (ref 1.7–7)
NEUTROPHILS NFR BLD AUTO: 69.8 % (ref 42.7–76)
NRBC BLD AUTO-RTO: 0 /100 WBC (ref 0–0.2)
PLATELET # BLD AUTO: 205 10*3/MM3 (ref 140–450)
PMV BLD AUTO: 10.9 FL (ref 6–12)
POTASSIUM SERPL-SCNC: 4.6 MMOL/L (ref 3.5–5.2)
RBC # BLD AUTO: 4.03 10*6/MM3 (ref 4.14–5.8)
SODIUM SERPL-SCNC: 138 MMOL/L (ref 136–145)
WBC NRBC COR # BLD: 8.77 10*3/MM3 (ref 3.4–10.8)

## 2022-09-12 PROCEDURE — 80048 BASIC METABOLIC PNL TOTAL CA: CPT

## 2022-09-12 PROCEDURE — 82962 GLUCOSE BLOOD TEST: CPT

## 2022-09-12 PROCEDURE — 85025 COMPLETE CBC W/AUTO DIFF WBC: CPT

## 2022-09-12 PROCEDURE — 93451 RIGHT HEART CATH: CPT | Performed by: INTERNAL MEDICINE

## 2022-09-12 PROCEDURE — 36415 COLL VENOUS BLD VENIPUNCTURE: CPT

## 2022-09-12 PROCEDURE — 85018 HEMOGLOBIN: CPT

## 2022-09-12 PROCEDURE — S0260 H&P FOR SURGERY: HCPCS | Performed by: INTERNAL MEDICINE

## 2022-09-12 PROCEDURE — C1894 INTRO/SHEATH, NON-LASER: HCPCS | Performed by: INTERNAL MEDICINE

## 2022-09-12 PROCEDURE — 85014 HEMATOCRIT: CPT

## 2022-09-12 PROCEDURE — C1769 GUIDE WIRE: HCPCS | Performed by: INTERNAL MEDICINE

## 2022-09-12 PROCEDURE — 82810 BLOOD GASES O2 SAT ONLY: CPT

## 2022-09-12 RX ORDER — SODIUM CHLORIDE 9 MG/ML
75 INJECTION, SOLUTION INTRAVENOUS CONTINUOUS
Status: DISCONTINUED | OUTPATIENT
Start: 2022-09-12 | End: 2022-09-12 | Stop reason: HOSPADM

## 2022-09-12 RX ORDER — DOXAZOSIN MESYLATE 4 MG/1
4 TABLET ORAL NIGHTLY
COMMUNITY

## 2022-09-12 RX ORDER — SODIUM CHLORIDE 0.9 % (FLUSH) 0.9 %
10 SYRINGE (ML) INJECTION AS NEEDED
Status: DISCONTINUED | OUTPATIENT
Start: 2022-09-12 | End: 2022-09-12 | Stop reason: HOSPADM

## 2022-09-12 RX ORDER — ACETAMINOPHEN 325 MG/1
650 TABLET ORAL EVERY 4 HOURS PRN
Status: DISCONTINUED | OUTPATIENT
Start: 2022-09-12 | End: 2022-09-12 | Stop reason: HOSPADM

## 2022-09-12 RX ORDER — SODIUM CHLORIDE 0.9 % (FLUSH) 0.9 %
10 SYRINGE (ML) INJECTION EVERY 12 HOURS SCHEDULED
Status: DISCONTINUED | OUTPATIENT
Start: 2022-09-12 | End: 2022-09-12 | Stop reason: HOSPADM

## 2022-09-12 RX ORDER — LIDOCAINE HYDROCHLORIDE 20 MG/ML
INJECTION, SOLUTION INFILTRATION; PERINEURAL AS NEEDED
Status: DISCONTINUED | OUTPATIENT
Start: 2022-09-12 | End: 2022-09-12 | Stop reason: HOSPADM

## 2022-09-12 RX ADMIN — SODIUM CHLORIDE 75 ML/HR: 9 INJECTION, SOLUTION INTRAVENOUS at 10:12

## 2022-09-12 NOTE — H&P
Date of Hospital Visit: 22  Encounter Provider: Frank Rodríguez MD  Place of Service: [facility] CARDIOLOGY  Patient Name: Joseph Mcdonald  :1955  [id number]    Chief complaint: Shortness of breath    History of Present Illness: Morbidly obese gentleman with shortness of breath and noninvasive testing suggesting pulmonary hypertension is referred for right heart cath from his pulmonologist.  No bleeding difficulty no allergies    Past Medical History:   Diagnosis Date   • Diabetes mellitus (HCC)    • Hyperlipidemia    • Hypertension        Past Surgical History:   Procedure Laterality Date   • LAPAROSCOPIC GASTRIC BANDING N/A            Medications Prior to Admission   Medication Sig Dispense Refill Last Dose   • atorvastatin (LIPITOR) 20 MG tablet Take 1 tablet by mouth Daily. 90 tablet 1 2022 at Unknown time   • doxazosin (CARDURA) 4 MG tablet Take 4 mg by mouth Every Night.   2022 at Unknown time   • fexofenadine (ALLEGRA) 180 MG tablet Take 1 tablet by mouth Daily. 30 tablet 3 2022 at Unknown time   • furosemide (LASIX) 40 MG tablet TAKE ONE TABLET BY MOUTH TWICE A DAY 40 tablet 1 2022 at Unknown time   • glipizide (Glucotrol) 5 MG tablet 1 q am (Patient taking differently: Take 10 mg by mouth 2 (Two) Times a Day Before Meals.) 30 tablet 0 2022 at Unknown time   • losartan (COZAAR) 100 MG tablet Take 1 tablet by mouth Every Morning. 30 tablet 3 2022 at Unknown time   • metFORMIN (GLUCOPHAGE) 500 MG tablet Take 2 tablets by mouth 2 (Two) Times a Day With Meals. 360 tablet 2 2022 at Unknown time   • pioglitazone (Actos) 15 MG tablet 1 tablet every morning (Patient taking differently: 15 mg. 1 tablet every morning) 90 tablet 3 2022 at Unknown time   • triamcinolone (KENALOG) 0.1 % ointment Apply 1 application topically to the appropriate area as directed 2 (Two) Times a Day. 30 g 0 Past Week at Unknown time   • Continuous Blood  Gluc Sensor (FreeStyle Missy 14 Day Sensor) misc Apply 1 Device topically to the appropriate area as directed.      • vitamin B-6 (PYRIDOXINE) 100 MG tablet Take 100 mg by mouth Daily.   Unknown at Unknown time       Current Meds  No current facility-administered medications on file prior to encounter.     Current Outpatient Medications on File Prior to Encounter   Medication Sig Dispense Refill   • atorvastatin (LIPITOR) 20 MG tablet Take 1 tablet by mouth Daily. 90 tablet 1   • doxazosin (CARDURA) 4 MG tablet Take 4 mg by mouth Every Night.     • fexofenadine (ALLEGRA) 180 MG tablet Take 1 tablet by mouth Daily. 30 tablet 3   • furosemide (LASIX) 40 MG tablet TAKE ONE TABLET BY MOUTH TWICE A DAY 40 tablet 1   • glipizide (Glucotrol) 5 MG tablet 1 q am (Patient taking differently: Take 10 mg by mouth 2 (Two) Times a Day Before Meals.) 30 tablet 0   • losartan (COZAAR) 100 MG tablet Take 1 tablet by mouth Every Morning. 30 tablet 3   • metFORMIN (GLUCOPHAGE) 500 MG tablet Take 2 tablets by mouth 2 (Two) Times a Day With Meals. 360 tablet 2   • pioglitazone (Actos) 15 MG tablet 1 tablet every morning (Patient taking differently: 15 mg. 1 tablet every morning) 90 tablet 3   • triamcinolone (KENALOG) 0.1 % ointment Apply 1 application topically to the appropriate area as directed 2 (Two) Times a Day. 30 g 0   • Continuous Blood Gluc Sensor (FreeStyle Missy 14 Day Sensor) misc Apply 1 Device topically to the appropriate area as directed.     • vitamin B-6 (PYRIDOXINE) 100 MG tablet Take 100 mg by mouth Daily.         Social History     Socioeconomic History   • Marital status: Single   Tobacco Use   • Smoking status: Former Smoker     Packs/day: 0.50     Years: 10.00     Pack years: 5.00     Types: Cigarettes     Quit date: 1970     Years since quittin.7   • Smokeless tobacco: Never Used   Substance and Sexual Activity   • Alcohol use: Never   • Drug use: Never       Family Hx: Non-contributory    REVIEW OF  "SYSTEMS:   ROS was performed and is negative except as outlined in HPI     REVIEW OF SYSTEMS:   CONSTITUTIONAL: No weight loss, fever, chills, weakness or fatigue.   HEENT: Eyes: No visual loss, blurred vision, double vision or yellow sclerae. Ears, Nose, Throat: No hearing loss, sneezing, congestion, runny nose or sore throat.   SKIN: No rash or itching.     RESPIRATORY: No shortness of breath, hemoptysis, cough or sputum.   GASTROINTESTINAL: No anorexia, nausea, vomiting or diarrhea. No abdominal pain, bright red blood per rectum or melena.  NEUROLOGICAL: No headache, dizziness, syncope, paralysis, numbness or tingling in the extremities.  MUSCULOSKELETAL: No muscle, back pain, joint pain or stiffness.   HEMATOLOGIC: No anemia, bleeding or bruising.   LYMPHATICS: No enlarged nodes.  PSYCHIATRIC: No history of depression, anxiety, hallucinations.   ENDOCRINOLOGIC: No reports of sweating, cold or heat intolerance. No polyuria or polydipsia.        Objective:     Vitals:    09/12/22 1000   BP: (!) 155/105   BP Location: Right arm   Patient Position: Sitting   Pulse: 103   Resp: 20   Temp: 98.1 °F (36.7 °C)   TempSrc: Tympanic   SpO2: 91%   Weight: (!) 199 kg (438 lb)   Height: 177.8 cm (70\")     Body mass index is 62.85 kg/m².  Flowsheet Rows    Flowsheet Row First Filed Value   Admission Height 177.8 cm (70\") Documented at 09/12/2022 1000   Admission Weight 199 kg (438 lb) Documented at 09/12/2022 1000          General Appearance:    Alert, oriented x 3, in no acute distress   Head:    Normocephalic, without obvious abnormality, atraumatic   Ears:    Ears appear intact with no abnormalities noted   Throat:   No oral lesions, dentition good   Neck:   No adenopathy, supple, trachea midline, no thyromegaly, no carotid bruit, no JVD   Lungs:    Breath sounds are equal and  clear to auscultation    Heart:   Normal S1 and S2, RRR, no murmur/gallop or rub   Abdomen:    Normal bowel sounds, obese, soft non-tender, " non-distended, no organomegaly, no guarding   Extremities:   Moves all extremities well, no edema, no cyanosis, no redness   Pulses:   Pulses palpable and equal bilaterally. Normal radial pulses   Skin:   No bleeding, bruising or rash   Lymph nodes:   No palpable adenopathy     I personally viewed and interpreted the patient's EKG/Telemetry data    Assessment:  Active Hospital Problems    Diagnosis  POA   • **Pulmonary hypertension, moderate to severe (HCC) [I27.20]  Unknown      Resolved Hospital Problems   No resolved problems to display.       Plan: Noninvasive testing reveals probable pulmonary hypertension referred for right heart cath.  H&P reviewed. The patient was examined and there are no changes to the H&P. I have explained the risks and benefits of the procedure to the patient.  The patient understands and agrees to proceed

## 2022-09-12 NOTE — DISCHARGE INSTRUCTIONS
[facility]  [address] [place] [address]    Right heart catheterization - After Care    Refer to this sheet in the next few weeks. These instructions provide you with information on caring for yourself after your procedure. Your caregiver may also give you more specific instructions. Your treatment has been planned according to current medical practices, but problems sometimes occur. Call your caregiver if you have any problems or questions after your procedure.    Home Care Instructions:  You may shower the day after the procedure. Remove the bandage (dressing) and gently wash the site with plain soap and water. Gently pat the site dry. You may apply a band aid daily for 2 days if desired.    Do not apply powder or lotion to the site.  Do not submerge the affected site in water for 3 to 5 days or until the site is completely healed.   Do not lift, push or pull anything over 5 pounds for 48 hours.  As a reference, a gallon of milk weighs 8 pounds.   Inspect the site at least twice daily. You may notice some bruising at the site and it may be tender for 1 to 2 weeks.           Call Your Doctor if:   You have unusual pain at the elbow site.  You have redness, warmth, swelling, or pain at the radial/ulnar (wrist) site.  You have drainage (other than a small amount of blood on the dressing).  `You have chills or a fever > 101.  Your arm becomes pale or dark, cool, tingly, or numb.  You develop chest pain, shortness of breath, feel faint or pass out.    You have heavy bleeding from the site, hold pressure on the site for 20 minutes.  If the bleeding stops, apply a fresh bandage and call your cardiologist.  However, if you        continue to have bleeding, call 911 and continue to apply pressure to the site.   You have any symptoms of a stroke.  Remember BE FAST  B-balance. Sudden trouble walking or loss of balance.  E-eyes.  Sudden changes in how you see or a sudden onset of a very bad headache.    F-face. Sudden weakness or loss of feeling of the face or facial droop on one side.   A-arms Sudden weakness or numbness in one arm.  One arm drifts down if they are both held out in front of you. This happens suddenly and usually on one side of the body.   S-speech.  Sudden trouble speaking, slurred speech or trouble understanding what are saying.   T-time  Time to call emergency services.  Write down the symptoms and the time they started.

## 2022-09-14 LAB
HCT VFR BLDA CALC: 36 % (ref 38–51)
HGB BLDA-MCNC: 12.2 G/DL (ref 12–17)
SAO2 % BLDA: 69 % (ref 95–98)

## 2022-10-07 ENCOUNTER — TELEPHONE (OUTPATIENT)
Dept: CARDIOLOGY | Facility: CLINIC | Age: 67
End: 2022-10-07

## 2022-10-07 NOTE — TELEPHONE ENCOUNTER
Pt was scheduled today for an ekg only(no charge).  Per Dr. Rodríguez, pt was to follow up with Dr. Handy for an office visit, not ekg only.  Pt scheduled an appointment to see Dr. Handy for a follow up visit on 10/11/22 at 11:30am.  Pt is aware of the appointment.  Pt was advised to call with any questions/concerns before then.  EKG was signed and scanned.

## 2022-10-25 RX ORDER — FUROSEMIDE 40 MG/1
TABLET ORAL
Qty: 40 TABLET | Refills: 1 | Status: SHIPPED | OUTPATIENT
Start: 2022-10-25

## 2022-10-26 ENCOUNTER — OFFICE VISIT (OUTPATIENT)
Dept: CARDIOLOGY | Facility: CLINIC | Age: 67
End: 2022-10-26

## 2022-10-26 VITALS
WEIGHT: 315 LBS | HEART RATE: 105 BPM | DIASTOLIC BLOOD PRESSURE: 88 MMHG | SYSTOLIC BLOOD PRESSURE: 148 MMHG | BODY MASS INDEX: 45.1 KG/M2 | HEIGHT: 70 IN

## 2022-10-26 DIAGNOSIS — R06.02 SHORTNESS OF BREATH: ICD-10-CM

## 2022-10-26 DIAGNOSIS — I77.810 AORTIC ROOT DILATATION: ICD-10-CM

## 2022-10-26 DIAGNOSIS — R60.0 BILATERAL LEG EDEMA: ICD-10-CM

## 2022-10-26 DIAGNOSIS — I27.20 PULMONARY HYPERTENSION: Primary | ICD-10-CM

## 2022-10-26 PROCEDURE — 93000 ELECTROCARDIOGRAM COMPLETE: CPT | Performed by: STUDENT IN AN ORGANIZED HEALTH CARE EDUCATION/TRAINING PROGRAM

## 2022-10-26 PROCEDURE — 99214 OFFICE O/P EST MOD 30 MIN: CPT | Performed by: STUDENT IN AN ORGANIZED HEALTH CARE EDUCATION/TRAINING PROGRAM

## 2022-10-26 NOTE — PROGRESS NOTES
Subjective:     Encounter Date:10/26/2022      Patient ID: Joseph Mcdonald is a 67 y.o. male.    Chief Complaint:  Follow-up post right heart cath    HPI:   67 y.o. male with a past medical history significant for severe obesity, hypertension, hyperlipidemia, diabetes, postcapillary pulmonary hypertension who presents for follow-up after recent right heart cath.  Patient had undergone echocardiogram on January 25, 2022 evaluation of shortness of breath at [place] which revealed normal EF at 70%, mild LVH, normal left atrial pressures, mildly dilated LA, RVSP of 63, mild dilation of the aortic root of 4.1 cm.  He subsequently followed up with his primary care in April 2022 and was noted to have diffuse wheezing and crackles so he was started on Lasix.  He underwent right heart catheterization on 9/12/2022 for evaluation of pulmonary hypertension and that revealed postcapillary pulmonary hypertension with an RA of 9, RV 40/6, pulmonary artery pressure 46/22/34, wedge 23, cardiac output 7.41, PVR 1.48.  He has been compliant with his Lasix and notes frequent urination.  His current weight is 430, 8 pounds lighter than on 9/12/2022.  With respect to his shortness of breath, he does note significant improvement.  He is experiencing mild dyspnea with some exertion.    The following portions of the patient's history were reviewed and updated as appropriate: allergies, current medications, past family history, past medical history, past social history, past surgical history and problem list.     REVIEW OF SYSTEMS:   All systems reviewed.  Pertinent positives identified in HPI.  All other systems are negative.    Past Medical History:   Diagnosis Date   • Diabetes mellitus (HCC)    • Hyperlipidemia    • Hypertension        History reviewed. No pertinent family history.    Social History     Socioeconomic History   • Marital status: Single   Tobacco Use   • Smoking status: Former     Packs/day: 0.50     Years: 10.00      Pack years: 5.00     Types: Cigarettes     Quit date: 1970     Years since quittin.8   • Smokeless tobacco: Never   Substance and Sexual Activity   • Alcohol use: Never   • Drug use: Never       Allergies   Allergen Reactions   • Codeine Diarrhea and Nausea And Vomiting   • Penicillins Other (See Comments)   • Sulfa Antibiotics Other (See Comments)       Past Surgical History:   Procedure Laterality Date   • CARDIAC CATHETERIZATION N/A 2022    Procedure: Right Heart Cath;  Surgeon: Frank Rodríguez MD;  Location: [facility] CATH INVASIVE LOC[facility];  Service: Cardiology;  Laterality: N/A;   • LAPAROSCOPIC GASTRIC BANDING N/A              ECG 12 Lead    Date/Time: 10/26/2022 2:36 PM  Performed by: Jose Miguel Handy MD  Authorized by: Jose Miguel Handy MD   Comparison: compared with previous ECG from 10/7/2022  Similar to previous ECG  Comparison to previous ECG: Rate has increased  Rhythm: sinus tachycardia  Rate: tachycardic  Conduction: right bundle branch block  QRS axis: right    Clinical impression: abnormal EKG               Objective:         Vitals:    10/26/22 1359   BP: 148/88   Pulse: 105       PHYSICAL EXAM:  GEN: well appearing, obese, in NAD   HEENT: NCAT, EOMI, moist mucus membranes   Respiratory: Distant breath sounds but no crackles, wheezing  CV: normal rate, regular rhythm, normal S1, S2, no murmurs, rubs, gallops, +2 radial pulses b/l  GI: soft, protuberant, nontender, nondistended  MSK: Trace bilateral edema with chronic venous stasis changes  Skin: no rash, warm, dry  Heme/Lymph: no bruising or bleeding  Psych: organized thought, normal behavior and affect  Neuro: Alert and Oriented x 3, grossly normal motor function        Assessment:         (I27.20) Pulmonary hypertension (HCC)    (R60.0) Bilateral leg edema    (R06.02) Shortness of breath    (I77.810) Aortic root dilatation (HCC)       Plan:       #Pulmonary hypertension  Mild postcapillary pulmonary hypertension with mean PA of 34 and PVR of  1.5.  It seems that he has responded well to Lasix.  He has trace residual lower extremity edema.  Thoracic echo in January without LV dysfunction, or diastolic dysfunction and normal valvular disease.  - Counseled on weight loss, he is to follow-up with his endocrinologist in November  - Continue Lasix 40 mg daily  - He had labs drawn today we will follow-up BMP, magnesium    #Bilateral extreme edema/shortness of breath  - Management as above    #Aortic root dilatation  Echo with aortic root of 4.1 cm  - We will obtain CTA for baseline assessment when patient returns in 6 months    Dr Paredes, thank you very much for referring this kind patient to me. Please call me with any questions or concerns. I will see the patient again in the office in 6 months.         Jose Miguel Handy MD  10/26/22  [facility]    Outpatient Encounter Medications as of 10/26/2022   Medication Sig Dispense Refill   • atorvastatin (LIPITOR) 20 MG tablet Take 1 tablet by mouth Daily. 90 tablet 1   • Continuous Blood Gluc Sensor (AVdirectStyle Missy 14 Day Sensor) misc Apply 1 Device topically to the appropriate area as directed.     • doxazosin (CARDURA) 4 MG tablet Take 4 mg by mouth Every Night.     • fexofenadine (ALLEGRA) 180 MG tablet Take 1 tablet by mouth Daily. 30 tablet 3   • furosemide (LASIX) 40 MG tablet TAKE ONE TABLET BY MOUTH TWICE A DAY 40 tablet 1   • glipizide (Glucotrol) 5 MG tablet 1 q am (Patient taking differently: Take 2 tablets by mouth 2 (Two) Times a Day Before Meals.) 30 tablet 0   • losartan (COZAAR) 100 MG tablet Take 1 tablet by mouth Every Morning. 30 tablet 3   • metFORMIN (GLUCOPHAGE) 500 MG tablet Take 2 tablets by mouth 2 (Two) Times a Day With Meals. 360 tablet 2   • pioglitazone (Actos) 15 MG tablet 1 tablet every morning (Patient taking differently: 1 tablet. 1 tablet every morning) 90 tablet 3   • [DISCONTINUED] triamcinolone (KENALOG) 0.1 % ointment Apply 1 application topically to the appropriate  area as directed 2 (Two) Times a Day. 30 g 0   • [DISCONTINUED] vitamin B-6 (PYRIDOXINE) 100 MG tablet Take 100 mg by mouth Daily.       No facility-administered encounter medications on file as of 10/26/2022.

## 2022-12-08 ENCOUNTER — TELEPHONE (OUTPATIENT)
Dept: FAMILY MEDICINE CLINIC | Facility: CLINIC | Age: 67
End: 2022-12-08

## 2022-12-08 NOTE — TELEPHONE ENCOUNTER
Caller: JIMENA ROLLINS     Relationship: [unfilled]     Best call back number: [phone]     What is your medical concern? ADMITTED TO [facility] THIS AM DUE TO COVID PNEUMONIA.     How long has this issue been going on? 1AM TODAY     Is your provider already aware of this issue? NO    Have you been treated for this issue? YES

## 2023-01-05 ENCOUNTER — INPATIENT HOSPITAL (OUTPATIENT)
Dept: URBAN - METROPOLITAN AREA HOSPITAL 107 | Facility: HOSPITAL | Age: 68
End: 2023-01-05
Payer: MEDICARE

## 2023-01-05 DIAGNOSIS — R13.10 DYSPHAGIA, UNSPECIFIED: ICD-10-CM

## 2023-01-05 DIAGNOSIS — U07.1 COVID-19: ICD-10-CM

## 2023-01-05 DIAGNOSIS — Z46.59 ENCOUNTER FOR FITTING AND ADJUSTMENT OF OTHER GASTROINTESTIN: ICD-10-CM

## 2023-01-05 DIAGNOSIS — R63.30 FEEDING DIFFICULTIES, UNSPECIFIED: ICD-10-CM

## 2023-01-05 DIAGNOSIS — J12.82 PNEUMONIA DUE TO CORONAVIRUS DISEASE 2019: ICD-10-CM

## 2023-01-05 PROCEDURE — 99222 1ST HOSP IP/OBS MODERATE 55: CPT | Performed by: NURSE PRACTITIONER

## 2023-01-06 ENCOUNTER — INPATIENT HOSPITAL (OUTPATIENT)
Dept: URBAN - METROPOLITAN AREA HOSPITAL 107 | Facility: HOSPITAL | Age: 68
End: 2023-01-06
Payer: MEDICARE

## 2023-01-06 DIAGNOSIS — K20.90 ESOPHAGITIS, UNSPECIFIED WITHOUT BLEEDING: ICD-10-CM

## 2023-01-06 DIAGNOSIS — R13.10 DYSPHAGIA, UNSPECIFIED: ICD-10-CM

## 2023-01-06 PROCEDURE — 43246 EGD PLACE GASTROSTOMY TUBE: CPT | Performed by: INTERNAL MEDICINE

## 2023-01-07 ENCOUNTER — INPATIENT HOSPITAL (OUTPATIENT)
Dept: URBAN - METROPOLITAN AREA HOSPITAL 107 | Facility: HOSPITAL | Age: 68
End: 2023-01-07
Payer: MEDICARE

## 2023-01-07 DIAGNOSIS — Z43.1 ENCOUNTER FOR ATTENTION TO GASTROSTOMY: ICD-10-CM

## 2023-01-07 PROCEDURE — 99232 SBSQ HOSP IP/OBS MODERATE 35: CPT | Performed by: INTERNAL MEDICINE

## 2023-01-31 ENCOUNTER — TELEPHONE (OUTPATIENT)
Dept: FAMILY MEDICINE CLINIC | Facility: CLINIC | Age: 68
End: 2023-01-31

## 2023-01-31 NOTE — TELEPHONE ENCOUNTER
Caller: JIMENA ROLLINS    Relationship: Emergency Contact    Best call back number: [phone]    What is the best time to reach you: ANY     Who are you requesting to speak with (clinical staff, provider,  specific staff member): CLINICAL STAFF    What was the call regarding: BEEN IN HOSPITAL SINCE December 8TH WITH COVID AND PNEUMONIA. IS BEING RELEASED FROM REHAB TO HOME IN THE NEXT 48 HOURS AND HE IS UNABLE TO TAKE CARE OF HIMSELF. SISTER IS CONCERNED ON THE RELEASE AND WOULD LIKE A CALL BACK. HE IS CURRENTLY AT [facility].     Do you require a callback: YES

## 2023-02-01 NOTE — TELEPHONE ENCOUNTER
Caller: JIMENA ROLLINS    Relationship: Emergency Contact    Best call back number: [phone]    What was the call regarding: PATIENT'S SISTER STATES SHE WAS TOLD DR. ROGERS WOULD HAVE TO CALL OR FAX THE NURSING HOME FOR THE INFORMATION.

## 2023-02-03 ENCOUNTER — TELEPHONE (OUTPATIENT)
Dept: FAMILY MEDICINE CLINIC | Facility: CLINIC | Age: 68
End: 2023-02-03
Payer: COMMERCIAL

## 2023-02-06 ENCOUNTER — TELEPHONE (OUTPATIENT)
Dept: FAMILY MEDICINE CLINIC | Facility: CLINIC | Age: 68
End: 2023-02-06

## 2023-02-06 NOTE — TELEPHONE ENCOUNTER
Caller: JIMENA ROLLINS    Relationship: Brother/Sister    Best call back number: [phone]   OR TI @ [phone]    Who are you requesting to speak with (clinical staff, provider,  specific staff member): DR ROGERS    What was the call regarding: PATIENT'S SISTER STATES THAT HE IS CURRENTLY IN A REHAB CENTER, BUT HE IS GOING TO BE DISCHARGED, AND DOES NOT HAVE ANOTHER PLACE TO GO. REQUESTS A CALL BACK TO HER OR THEIR SISTER, TI, TO DISCUSS FURTHER CONCERNS AND ADVICE    Do you require a callback: YES

## 2023-02-08 ENCOUNTER — TELEPHONE (OUTPATIENT)
Dept: FAMILY MEDICINE CLINIC | Facility: CLINIC | Age: 68
End: 2023-02-08

## 2023-02-08 NOTE — TELEPHONE ENCOUNTER
Caller: Joseph Mcdonald    Relationship: Self    Best call back number: [phone]    What medication are you requesting: INSULIN NEEDLES AND FREESTYLE RALEIGH SENSOR    Have you had these symptoms before:    [x] Yes  [] No    Have you been treated for these symptoms before:   [x] Yes  [] No    If a prescription is needed, what is your preferred pharmacy and phone number: [facility] [id number] - [place], [place] - [address]  - [phone]  - [phone] FX     Additional notes: PATIENT STATES THAT HE WAS RECENTLY RELEASED FROM A REHAB FACILITY, AND HAD BEEN PRESCRIBED INSULIN THERE, BUT DID NOT GET A PRESCRIPTION FOR THE NEEDLES. HE ALSO WAS ADVISED TO CHECK HIS BLOOD SUGAR 4 TIMES A DAY, BUT DOES NOT WANT TO HAVE TO STICK THAT MANY TIMES A DAY. HE HAD PREVIOUSLY HAD THE RALEIGH SENSOR, AND REQUESTS A PRESCRIPTION FOR THAT DEVICE. CALL IF ANY ADDITIONAL FOLLOW UP NEEDED

## 2023-02-09 ENCOUNTER — TELEPHONE (OUTPATIENT)
Dept: FAMILY MEDICINE CLINIC | Facility: CLINIC | Age: 68
End: 2023-02-09

## 2023-02-09 NOTE — TELEPHONE ENCOUNTER
Caller: JIMENA    Relationship: Brother/Sister    Best call back number: [phone]    What was the call regarding: PATIENTS SISTER WOULD LIKE TO KNOW IF THE NEEDLE FOR PATIENT HAS PRE FIXED INSULIN ALREADY IN IT.    PLEASE CALL.    Do you require a callback: YES

## 2023-02-09 NOTE — TELEPHONE ENCOUNTER
Caller: BINA LIN    Relationship: Home Health    Best call back number: [phone]    What orders are you requesting (i.e. lab or imaging): HOME HEALTH     In what timeframe would the patient need to come in: AS SOON AS POSSIBLE     Where will you receive your lab/imaging services: HOME     Additional notes: NEEDS SKILLED NURSING ORDERS FOR PATIENT FOR 1 TIME FOR 4 WEEKS. NEEDS A PT AND OT EVALUATION ALSO.

## 2023-02-09 NOTE — TELEPHONE ENCOUNTER
[facility]    Best call back number: [phone]    What orders are you requesting (i.e. lab or imaging): CONTINUE PT FOR 2 TIMES A WEEK FOR 4 WEEKS     In what timeframe would the patient need to come in: ASAP

## 2023-02-17 ENCOUNTER — OFFICE VISIT (OUTPATIENT)
Dept: FAMILY MEDICINE CLINIC | Facility: CLINIC | Age: 68
End: 2023-02-17
Payer: MEDICARE

## 2023-02-17 VITALS
HEIGHT: 70 IN | DIASTOLIC BLOOD PRESSURE: 88 MMHG | OXYGEN SATURATION: 98 % | SYSTOLIC BLOOD PRESSURE: 124 MMHG | BODY MASS INDEX: 45.1 KG/M2 | WEIGHT: 315 LBS | RESPIRATION RATE: 16 BRPM | HEART RATE: 90 BPM

## 2023-02-17 DIAGNOSIS — K21.00 GASTROESOPHAGEAL REFLUX DISEASE WITH ESOPHAGITIS WITHOUT HEMORRHAGE: ICD-10-CM

## 2023-02-17 DIAGNOSIS — R06.02 SHORTNESS OF BREATH: ICD-10-CM

## 2023-02-17 DIAGNOSIS — I27.20 PULMONARY HYPERTENSION, MODERATE TO SEVERE: ICD-10-CM

## 2023-02-17 DIAGNOSIS — G47.33 OSA (OBSTRUCTIVE SLEEP APNEA): ICD-10-CM

## 2023-02-17 DIAGNOSIS — Z43.1 ATTENTION TO G-TUBE: ICD-10-CM

## 2023-02-17 DIAGNOSIS — E66.01 MORBID OBESITY WITH BMI OF 50.0-59.9, ADULT: ICD-10-CM

## 2023-02-17 DIAGNOSIS — E10.9 TYPE 1 DIABETES MELLITUS WITHOUT COMPLICATION: Primary | ICD-10-CM

## 2023-02-17 DIAGNOSIS — M79.672 BILATERAL FOOT PAIN: ICD-10-CM

## 2023-02-17 DIAGNOSIS — R60.0 BILATERAL LOWER EXTREMITY EDEMA: ICD-10-CM

## 2023-02-17 DIAGNOSIS — I10 HYPERTENSION, UNSPECIFIED TYPE: ICD-10-CM

## 2023-02-17 DIAGNOSIS — M79.671 BILATERAL FOOT PAIN: ICD-10-CM

## 2023-02-17 PROBLEM — U07.1 COVID-19 VIRUS INFECTION: Status: ACTIVE | Noted: 2022-12-08

## 2023-02-17 PROBLEM — I50.33 ACUTE ON CHRONIC DIASTOLIC (CONGESTIVE) HEART FAILURE: Status: ACTIVE | Noted: 2022-12-09

## 2023-02-17 PROBLEM — J18.9 COMMUNITY ACQUIRED PNEUMONIA: Status: ACTIVE | Noted: 2022-12-08

## 2023-02-17 PROBLEM — E87.1 HYPONATREMIA: Status: ACTIVE | Noted: 2022-12-08

## 2023-02-17 PROBLEM — IMO0002 DIABETES MELLITUS TYPE II, UNCONTROLLED: Status: ACTIVE | Noted: 2020-01-06

## 2023-02-17 PROBLEM — J96.01 ACUTE HYPOXEMIC RESPIRATORY FAILURE: Status: ACTIVE | Noted: 2022-12-08

## 2023-02-17 LAB
EXPIRATION DATE: ABNORMAL
HBA1C MFR BLD: 8.1 %
Lab: ABNORMAL

## 2023-02-17 PROCEDURE — 83036 HEMOGLOBIN GLYCOSYLATED A1C: CPT | Performed by: INTERNAL MEDICINE

## 2023-02-17 PROCEDURE — 3052F HG A1C>EQUAL 8.0%<EQUAL 9.0%: CPT | Performed by: INTERNAL MEDICINE

## 2023-02-17 PROCEDURE — 99214 OFFICE O/P EST MOD 30 MIN: CPT | Performed by: INTERNAL MEDICINE

## 2023-02-17 RX ORDER — INSULIN HUMAN 100 [IU]/ML
INJECTION, SOLUTION PARENTERAL
COMMUNITY
Start: 2023-02-08

## 2023-02-17 RX ORDER — GLIPIZIDE 5 MG/1
10 TABLET ORAL
COMMUNITY
Start: 2022-08-05 | End: 2023-08-05

## 2023-02-17 RX ORDER — INSULIN GLARGINE 100 [IU]/ML
INJECTION, SOLUTION SUBCUTANEOUS
COMMUNITY
Start: 2023-02-07

## 2023-02-17 RX ORDER — PSEUDOEPHEDRINE HCL 30 MG
100 TABLET ORAL DAILY
COMMUNITY
Start: 2023-01-12

## 2023-02-17 RX ORDER — IPRATROPIUM BROMIDE AND ALBUTEROL SULFATE 2.5; .5 MG/3ML; MG/3ML
3 SOLUTION RESPIRATORY (INHALATION)
COMMUNITY
Start: 2023-01-11

## 2023-02-17 RX ORDER — ESOMEPRAZOLE MAGNESIUM 40 MG/1
FOR SUSPENSION ORAL
COMMUNITY
Start: 2023-02-08

## 2023-02-17 RX ORDER — BLOOD-GLUCOSE METER
EACH MISCELLANEOUS
COMMUNITY
Start: 2023-02-07

## 2023-02-21 RX ORDER — OMEPRAZOLE 20 MG/1
TABLET, DELAYED RELEASE ORAL
Qty: 30 TABLET | Refills: 3 | Status: SHIPPED | OUTPATIENT
Start: 2023-02-21

## 2023-02-22 NOTE — PROGRESS NOTES
02/17/2023    CC: Pneumonia (Follow up from [facility] 12/8/2022-1/11/2023.  [facility]---no other issues)  .        HPI  Pneumonia  He complains of cough, difficulty breathing and shortness of breath. This is a new problem. The current episode started more than 1 month ago. The problem occurs rarely. The problem has been resolved. Associated symptoms include appetite change and weight loss. His symptoms are aggravated by emotional stress, exposure to fumes and any activity. His symptoms are alleviated by beta-agonist. He reports significant improvement on treatment.        Subjective   Joseph Mcdonald is a 67 y.o. male.      The following portions of the patient's history were reviewed and updated as appropriate: allergies, current medications, past family history, past medical history, past social history, past surgical history and problem list.    Problem List  Patient Active Problem List   Diagnosis   • Diabetes mellitus type 2, uncontrolled, with complications   • HTN (hypertension)   • Hyperlipidemia   • LAP-BAND surgery status   • Morbid obesity (HCC)   • Morbid obesity with BMI of 50.0-59.9, adult (HCC)   • Health care maintenance   • Anasarca   • Bilateral lower extremity edema   • HERMINIA (obstructive sleep apnea)   • Pulmonary hypertension, moderate to severe (HCC)   • Diabetes mellitus type II, uncontrolled   • Acute hypoxemic respiratory failure (HCC)   • Acute on chronic diastolic (congestive) heart failure (HCC)   • Community acquired pneumonia   • COVID-19 virus infection   • Hyponatremia       Past Medical History  Past Medical History:   Diagnosis Date   • Diabetes mellitus (HCC)    • Hyperlipidemia    • Hypertension        Surgical History  Past Surgical History:   Procedure Laterality Date   • CARDIAC CATHETERIZATION N/A 9/12/2022    Procedure: Right Heart Cath;  Surgeon: Frank Rodríguez MD;  Location:  [facility] CATH INVASIVE LOCATION;  Service: Cardiology;  Laterality: N/A;    • LAPAROSCOPIC GASTRIC BANDING N/A            Family History  History reviewed. No pertinent family history.    Social History  Social History    Tobacco Use      Smoking status: Former        Packs/day: 0.50        Years: 10.00        Pack years: 5        Types: Cigarettes        Quit date: 1970        Years since quittin.1      Smokeless tobacco: Never       Is the Patient a current tobacco user? No    Allergies  Allergies   Allergen Reactions   • Acetaminophen Anaphylaxis   • Codeine Diarrhea and Nausea And Vomiting   • Penicillins Other (See Comments)   • Sulfa Antibiotics Other (See Comments)       Current Medications    Current Outpatient Medications:   •  atorvastatin (LIPITOR) 20 MG tablet, Take 1 tablet by mouth Daily., Disp: 90 tablet, Rfl: 1  •  Blood Glucose Monitoring Suppl (Accu-Chek Guide) w/Device kit, , Disp: , Rfl:   •  doxazosin (CARDURA) 4 MG tablet, Take 4 mg by mouth Every Night., Disp: , Rfl:   •  esomeprazole (NexIUM) 40 MG packet, , Disp: , Rfl:   •  fexofenadine (ALLEGRA) 180 MG tablet, Take 1 tablet by mouth Daily., Disp: 30 tablet, Rfl: 3  •  furosemide (LASIX) 40 MG tablet, TAKE ONE TABLET BY MOUTH TWICE A DAY, Disp: 40 tablet, Rfl: 1  •  glipizide (GLUCOTROL) 5 MG tablet, Take 10 mg by mouth 2 (Two) Times a Day Before Meals., Disp: , Rfl:   •  glucose blood test strip, Check blood glucose  4 times a day with Accuchek Guide strips DxE11.65., Disp: , Rfl:   •  ipratropium-albuterol (DUO-NEB) 0.5-2.5 mg/3 ml nebulizer, Inhale 3 mL., Disp: , Rfl:   •  losartan (COZAAR) 100 MG tablet, Take 1 tablet by mouth Every Morning., Disp: 30 tablet, Rfl: 3  •  metFORMIN (GLUCOPHAGE) 500 MG tablet, Take 2 tablets by mouth 2 (Two) Times a Day With Meals., Disp: 360 tablet, Rfl: 2  •  pioglitazone (Actos) 15 MG tablet, 1 tablet every morning (Patient taking differently: Take 30 mg by mouth Daily. 1 tablet every morning), Disp: 90 tablet, Rfl: 3  •  triamcinolone (KENALOG) 0.1 % ointment, ,  Disp: , Rfl:   •  docusate sodium 100 MG capsule, Take 100 mg by mouth Daily., Disp: , Rfl:   •  HumuLIN R 100 UNIT/ML injection, , Disp: , Rfl:   •  Lantus 100 UNIT/ML injection, , Disp: , Rfl:   •  omeprazole OTC (PrilOSEC OTC) 20 MG EC tablet, Take 1 tablet p.o. every morning, Disp: 30 tablet, Rfl: 3     Review of System  Review of Systems   Constitutional: Positive for appetite change and unexpected weight loss.   Eyes: Negative.    Respiratory: Positive for cough and shortness of breath.    Cardiovascular: Negative.    Gastrointestinal: Negative.    Endocrine: Negative.    Genitourinary: Negative.      I have reviewed and confirmed the accuracy of the ROS as documented by the MA/LPN/RN Dexter Paredes MD    Vitals:    02/17/23 1527   BP: 124/88   Pulse: 90   Resp: 16   SpO2: 98%     Body mass index is 59.12 kg/m².    Objective     Physical Exam  Physical Exam  HENT:      Head: Normocephalic.   Eyes:      Extraocular Movements: Extraocular movements intact.   Cardiovascular:      Rate and Rhythm: Normal rate and regular rhythm.      Pulses: Normal pulses.      Heart sounds: Normal heart sounds.   Pulmonary:      Effort: Pulmonary effort is normal.   Musculoskeletal:      Comments: Bilateral edema of the lower extremities has resolved.   Skin:     General: Skin is warm and dry.   Neurological:      General: No focal deficit present.      Mental Status: He is alert.      Comments: Rest tremor of the left hand         Assessment & Plan    This 67-year-old patient presents today following hospitalization and rehabilitation stay for COVID-pneumonia.  Patient significantly has a BMI of greater than 59.1.  He was just released from rehab about a week ago following a greater than 3-week stay.  He relates that his breathing has improved considerably over the past several days.  He has had his hospital bed for about a week and he relates that this helped tremendously with his breathing.  He states that he does not need  to use his O2 until night and any shortness of breath is alleviated with albuterol mini nebulizer treatments and his O2.    During his rehab stay he was started on insulin for diabetes mellitus.  His last hemoglobin A1c in reviewing his records was 8.1.  His glucose today was 134.  His last injection of insulin was 2 weeks or so ago he is currently on metformin 500 mg 2 tabs p.o. twice daily.    We gave the patient a mini nebulizer set up to use along with the albuterol solution he has at home.  His lungs are clear to auscultation and resonant percussion at this point.    Home health has been helping him with his glucose monitoring and we anticipate switching to a continuous glucose monitor in the future but at this time his insurance company does not cover it.    Patient still has a feeding tube in place and will get evaluation from surgery regarding its removal.    Patient relates that his feet hurt times the past several days he is able to ambulate with difficulty.  He thinks this may be attributable to his shoes however.        Diagnoses and all orders for this visit:    1. Type 1 diabetes mellitus without complication (HCC) (Primary)  -     POCT glycated hemoglobin, total    2. Attention to G-tube (HCC)  -     Ambulatory Referral to General Surgery    3. HERMINIA (obstructive sleep apnea)    4. Shortness of breath    5. Hypertension, unspecified type    6. Morbid obesity with BMI of 50.0-59.9, adult ([facility])    7. Pulmonary hypertension, moderate to severe ([facility])    8. Bilateral lower extremity edema    9. Bilateral foot pain  -     Ambulatory Referral to Podiatry    10. Gastroesophageal reflux disease with esophagitis without hemorrhage    Other orders  -     omeprazole OTC (PrilOSEC OTC) 20 MG EC tablet; Take 1 tablet p.o. every morning  Dispense: 30 tablet; Refill: 3      Plan:  1.)  Comprehensive metabolic profile  2.)  CBC  3.)  Chest x-ray PA and lateral  4.)  Referral to surgery for evaluation of G-tube  5.)   Patient is urged to follow-up with pulmonology for his pulmonary hypertension.  5.)  Referral to podiatry for evaluation of his bilateral foot pain.  6.)  Follow-up with us in the next few weeks.       Dexter Paredes MD  02/17/2023

## 2023-02-23 ENCOUNTER — TELEPHONE (OUTPATIENT)
Dept: FAMILY MEDICINE CLINIC | Facility: CLINIC | Age: 68
End: 2023-02-23

## 2023-02-23 NOTE — TELEPHONE ENCOUNTER
Caller: Joseph Mcdonald    Relationship to patient: Self    Best call back number: [phone]    Chief complaint:FOLLOW UP/BLOOD WORK    Type of visit: OFFICE VISIT    Requested date: Monday 2/27

## 2023-02-27 ENCOUNTER — OFFICE VISIT (OUTPATIENT)
Dept: FAMILY MEDICINE CLINIC | Facility: CLINIC | Age: 68
End: 2023-02-27
Payer: MEDICARE

## 2023-02-27 ENCOUNTER — TELEPHONE (OUTPATIENT)
Dept: FAMILY MEDICINE CLINIC | Facility: CLINIC | Age: 68
End: 2023-02-27

## 2023-02-27 VITALS
BODY MASS INDEX: 45.1 KG/M2 | HEART RATE: 110 BPM | TEMPERATURE: 98.4 F | SYSTOLIC BLOOD PRESSURE: 118 MMHG | HEIGHT: 70 IN | DIASTOLIC BLOOD PRESSURE: 80 MMHG | WEIGHT: 315 LBS | OXYGEN SATURATION: 95 %

## 2023-02-27 DIAGNOSIS — E11.65 TYPE 2 DIABETES MELLITUS WITH HYPERGLYCEMIA, WITHOUT LONG-TERM CURRENT USE OF INSULIN: ICD-10-CM

## 2023-02-27 DIAGNOSIS — E66.01 MORBID OBESITY WITH BMI OF 50.0-59.9, ADULT: Primary | ICD-10-CM

## 2023-02-27 DIAGNOSIS — I27.20 PULMONARY HYPERTENSION, MODERATE TO SEVERE: ICD-10-CM

## 2023-02-27 DIAGNOSIS — R10.10 PAIN OF UPPER ABDOMEN: ICD-10-CM

## 2023-02-27 DIAGNOSIS — I10 PRIMARY HYPERTENSION: ICD-10-CM

## 2023-02-27 PROCEDURE — 99214 OFFICE O/P EST MOD 30 MIN: CPT | Performed by: INTERNAL MEDICINE

## 2023-02-27 PROCEDURE — 3052F HG A1C>EQUAL 8.0%<EQUAL 9.0%: CPT | Performed by: INTERNAL MEDICINE

## 2023-02-27 RX ORDER — PANTOPRAZOLE SODIUM 40 MG/1
40 TABLET, DELAYED RELEASE ORAL DAILY
Qty: 30 TABLET | Refills: 4 | Status: SHIPPED | OUTPATIENT
Start: 2023-02-27

## 2023-02-27 NOTE — PROGRESS NOTES
02/27/2023    CC: Diabetes  .        HPI  Diabetes  He presents for his follow-up diabetic visit. He has type 2 diabetes mellitus. No MedicAlert identification noted. His disease course has been improving. There are no hypoglycemic associated symptoms. There are no diabetic associated symptoms. Symptoms are stable.        Subjective   Joseph Mcdonald is a 67 y.o. male.      The following portions of the patient's history were reviewed and updated as appropriate: allergies, current medications, past family history, past medical history, past social history, past surgical history and problem list.    Problem List  Patient Active Problem List   Diagnosis   • Diabetes mellitus type 2, uncontrolled, with complications   • HTN (hypertension)   • Hyperlipidemia   • LAP-BAND surgery status   • Morbid obesity (HCC)   • Morbid obesity with BMI of 50.0-59.9, adult (HCC)   • Health care maintenance   • Anasarca   • Bilateral lower extremity edema   • HERMINIA (obstructive sleep apnea)   • Pulmonary hypertension, moderate to severe (HCC)   • Diabetes mellitus type II, uncontrolled   • Acute hypoxemic respiratory failure (HCC)   • Acute on chronic diastolic (congestive) heart failure (HCC)   • Community acquired pneumonia   • COVID-19 virus infection   • Hyponatremia       Past Medical History  Past Medical History:   Diagnosis Date   • Diabetes mellitus (HCC)    • Hyperlipidemia    • Hypertension        Surgical History  Past Surgical History:   Procedure Laterality Date   • CARDIAC CATHETERIZATION N/A 9/12/2022    Procedure: Right Heart Cath;  Surgeon: Frank Rodríguez MD;  Location: [facility] INVASIVE LOCATION;  Service: Cardiology;  Laterality: N/A;   • LAPAROSCOPIC GASTRIC BANDING N/A     2014       Family History  History reviewed. No pertinent family history.    Social History  Social History    Tobacco Use      Smoking status: Former        Packs/day: 0.50        Years: 10.00        Pack years: 5        Types: Cigarettes         Quit date: 1970        Years since quittin.1      Smokeless tobacco: Never       Is the Patient a current tobacco user? No    Allergies  Allergies   Allergen Reactions   • Acetaminophen Anaphylaxis   • Codeine Diarrhea and Nausea And Vomiting   • Penicillins Other (See Comments)   • Sulfa Antibiotics Other (See Comments)       Current Medications    Current Outpatient Medications:   •  atorvastatin (LIPITOR) 20 MG tablet, Take 1 tablet by mouth Daily., Disp: 90 tablet, Rfl: 1  •  Blood Glucose Monitoring Suppl (Accu-Chek Guide) w/Device kit, , Disp: , Rfl:   •  docusate sodium 100 MG capsule, Take 100 mg by mouth Daily., Disp: , Rfl:   •  doxazosin (CARDURA) 4 MG tablet, Take 4 mg by mouth Every Night., Disp: , Rfl:   •  esomeprazole (NexIUM) 40 MG packet, , Disp: , Rfl:   •  fexofenadine (ALLEGRA) 180 MG tablet, Take 1 tablet by mouth Daily., Disp: 30 tablet, Rfl: 3  •  furosemide (LASIX) 40 MG tablet, TAKE ONE TABLET BY MOUTH TWICE A DAY, Disp: 40 tablet, Rfl: 1  •  glipizide (GLUCOTROL) 5 MG tablet, Take 10 mg by mouth 2 (Two) Times a Day Before Meals., Disp: , Rfl:   •  glucose blood test strip, Check blood glucose  4 times a day with Accuchek Guide strips DxE11.65., Disp: , Rfl:   •  HumuLIN R 100 UNIT/ML injection, , Disp: , Rfl:   •  ipratropium-albuterol (DUO-NEB) 0.5-2.5 mg/3 ml nebulizer, Inhale 3 mL., Disp: , Rfl:   •  Lantus 100 UNIT/ML injection, , Disp: , Rfl:   •  losartan (COZAAR) 100 MG tablet, Take 1 tablet by mouth Every Morning., Disp: 30 tablet, Rfl: 3  •  metFORMIN (GLUCOPHAGE) 500 MG tablet, Take 2 tablets by mouth 2 (Two) Times a Day With Meals., Disp: 360 tablet, Rfl: 2  •  omeprazole OTC (PrilOSEC OTC) 20 MG EC tablet, Take 1 tablet p.o. every morning, Disp: 30 tablet, Rfl: 3  •  pioglitazone (Actos) 15 MG tablet, 1 tablet every morning (Patient taking differently: Take 30 mg by mouth Daily. 1 tablet every morning), Disp: 90 tablet, Rfl: 3  •  triamcinolone (KENALOG) 0.1 % ointment,  , Disp: , Rfl:      Review of System  Review of Systems   Constitutional: Negative.    Eyes: Negative.    Respiratory: Negative.    Cardiovascular: Negative.    Gastrointestinal: Negative.      I have reviewed and confirmed the accuracy of the ROS as documented by the MA/LPN/RN Dexter Paredes MD    Vitals:    02/27/23 1054   BP: 118/80   Pulse: 110   Temp: 98.4 °F (36.9 °C)   SpO2: 95%     Body mass index is 59.83 kg/m².    Objective     Physical Exam  Physical Exam  Constitutional:       Appearance: Normal appearance.   Cardiovascular:      Rate and Rhythm: Regular rhythm.      Heart sounds: Normal heart sounds.   Pulmonary:      Effort: Pulmonary effort is normal.      Breath sounds: Normal breath sounds.   Musculoskeletal:      Cervical back: Normal range of motion and neck supple.   Neurological:      Mental Status: He is alert.         Assessment & Plan      + This 67-year-old patient presents today following hospitalization and rehabilitation for severe COPD.  Patient relates he is feeling significantly improved after having been discharged about 2 weeks ago.  He was at the [facility] facility.  Patient has a significant history of diabetes mellitus type 2 and during his rehab he relates that his glucose levels were consistently in the 200s or so.  His O2 saturation in October was 7.8.  His December level was 8.5 and his level 10 days ago was 8.1.  Patient currently is on metformin 500 mg 2 tablets in the morning 2 tablets in the afternoon.  He was tried on Rybelsus as an outpatient but found that it was intolerant causing significant diarrhea.  Note is made that his glucose level in his home today was 159.  His O2 saturation was 95% on room air.  His blood pressure today was 110/80 in the left arm sitting position standard cuff.    He needs TG 2 removed and will contact his surgeon regarding this procedure.  We will see the patient back for follow-up in about 2-1/2 months to reevaluate his diabetes control  and his continued weight loss  Diagnoses and all orders for this visit:    1. Morbid obesity with BMI of 50.0-59.9, adult (HCC) (Primary)    2. Primary hypertension    3. Type 2 diabetes mellitus with hyperglycemia, without long-term current use of insulin (HCC)    4. Pulmonary hypertension, moderate to severe ([facility])      Plan:  1.)  We will consider starting Januvia if his hemoglobin A1c and glucose levels are not in 2 acceptable range.  2.)  Continue current medication  3.)  Follow-up in 2 months  4.)  Make referral to Dr. Zach Slaughter surgeon regarding removal of the gastric tube.       Dexter Paredes MD  02/27/2023

## 2023-02-27 NOTE — TELEPHONE ENCOUNTER
Wanted to see who you give his prescription to in [place] for his shoes they called but have  Not called him back and he is trying to reach out to get update.

## 2023-02-28 NOTE — TELEPHONE ENCOUNTER
Called patient and told him what Dr. Paredes said patient asked if he should just wait for a call back I told him yes they should reach out to him.

## 2023-03-09 ENCOUNTER — TELEPHONE (OUTPATIENT)
Dept: FAMILY MEDICINE CLINIC | Facility: CLINIC | Age: 68
End: 2023-03-09

## 2023-03-09 NOTE — TELEPHONE ENCOUNTER
Caller: JIMENA RIA    Relationship: Brother/Sister    Best call back number: [phone]    What is the provider, practice or medical service name: [facility]    What is the office location: [address] [place] [address]    What is the office phone number: [phone]    Any additional details: PATIENTS SISTER STATED THAT DR ROGERS REFERRED PATIENT TO A FOOT DOCTOR IN [place] BUT THEY WOULD LIKE TO BE REFERRED TO THIS LOCATION.    SHE WOULD LIKE A CALL IF THIS REQUEST CAN BE MADE.

## 2023-03-10 DIAGNOSIS — M79.672 PAIN IN BOTH FEET: Primary | ICD-10-CM

## 2023-03-10 DIAGNOSIS — M79.671 PAIN IN BOTH FEET: Primary | ICD-10-CM

## 2023-03-16 DIAGNOSIS — I10 HYPERTENSION, UNSPECIFIED TYPE: ICD-10-CM

## 2023-03-17 RX ORDER — LOSARTAN POTASSIUM 100 MG/1
TABLET ORAL
Qty: 30 TABLET | Refills: 3 | Status: SHIPPED | OUTPATIENT
Start: 2023-03-17

## 2023-03-27 ENCOUNTER — OFFICE VISIT (OUTPATIENT)
Dept: SURGERY | Facility: CLINIC | Age: 68
End: 2023-03-27
Payer: MEDICARE

## 2023-03-27 VITALS — WEIGHT: 315 LBS | HEIGHT: 70 IN | BODY MASS INDEX: 45.1 KG/M2

## 2023-03-27 DIAGNOSIS — U07.1 COVID-19 VIRUS INFECTION: Primary | ICD-10-CM

## 2023-03-27 PROCEDURE — 99202 OFFICE O/P NEW SF 15 MIN: CPT | Performed by: SURGERY

## 2023-03-27 PROCEDURE — 1159F MED LIST DOCD IN RCRD: CPT | Performed by: SURGERY

## 2023-03-27 PROCEDURE — 1160F RVW MEDS BY RX/DR IN RCRD: CPT | Performed by: SURGERY

## 2023-03-27 NOTE — PROGRESS NOTES
Chief complaint: Unwanted gastrostomy tube    This is a nice 67-year-old morbidly obese gentleman who was hospitalized earlier this year with severe COVID infection.  He apparently required percutaneous feeding access.  This was placed on January 6, 2023.  Since then he has recovered nicely, requires no oxygen any longer and is eating normally without any further need for his G-tube.    Objective:  Weight 422 pounds BMI 60.6  General: Awake and alert without distress  Abdomen: Soft, obese, G-tube site is clean, minimal drainage    Assessment and plan:  -PEG that he has is no longer needed.  It has been in place long enough for removal and as such we will proceed with that today.  -With direct pressure his PEG was removed without difficulty.  Direct pressure was held over the site for hemostasis and the Band-Aid was then placed.  The patient tolerated this adequately.  He may follow-up as needed.      Slava Miranda MD  General and Endoscopic Surgery  [facility]    [address], [address]  [place], [place], [address]  P: [phone]  F: [phone]

## 2023-03-30 ENCOUNTER — PATIENT ROUNDING (BHMG ONLY) (OUTPATIENT)
Dept: SURGERY | Facility: CLINIC | Age: 68
End: 2023-03-30
Payer: MEDICARE

## 2023-03-30 DIAGNOSIS — J30.1 ALLERGIC RHINITIS DUE TO POLLEN, UNSPECIFIED SEASONALITY: ICD-10-CM

## 2023-03-30 RX ORDER — FEXOFENADINE HCL 180 MG/1
TABLET ORAL
Qty: 30 TABLET | Refills: 3 | Status: SHIPPED | OUTPATIENT
Start: 2023-03-30

## 2023-03-30 NOTE — PROGRESS NOTES
March 30, 2023    Hello, may I speak with Joseph Mcdonald?    My name is John      I am  with [facility]  [address]  [facility] [address] [phone].    Before we get started may I verify your date of birth? 1955    I am calling to officially welcome you to our practice and ask about your recent visit. Is this a good time to talk? yes    Tell me about your visit with us. What things went well?  Everything was fine.        We're always looking for ways to make our patients' experiences even better. Do you have recommendations on ways we may improve?  no    Overall were you satisfied with your first visit to our practice? yes       I appreciate you taking the time to speak with me today. Is there anything else I can do for you? no      Thank you, and have a great day.

## 2023-04-27 ENCOUNTER — TELEPHONE (OUTPATIENT)
Dept: FAMILY MEDICINE CLINIC | Facility: CLINIC | Age: 68
End: 2023-04-27

## 2023-04-27 NOTE — TELEPHONE ENCOUNTER
Caller: JIMENA LEVIN    Relationship to patient: Brother/Sister    Best call back number: [phone]    Patient is needing: PATIENTS SISTER WOULD LIKE TO KNOW IF PATIENT IS ABLE TO DRIVE. PLEASE ADVISE.     SISTER STATES THAT HE MAY NEED TO HEAR THIS FROM THE DOCTOR.

## 2023-05-05 ENCOUNTER — OFFICE VISIT (OUTPATIENT)
Dept: CARDIOLOGY | Facility: CLINIC | Age: 68
End: 2023-05-05
Payer: MEDICARE

## 2023-05-05 VITALS
WEIGHT: 315 LBS | DIASTOLIC BLOOD PRESSURE: 80 MMHG | BODY MASS INDEX: 45.1 KG/M2 | SYSTOLIC BLOOD PRESSURE: 108 MMHG | OXYGEN SATURATION: 93 % | HEART RATE: 88 BPM | HEIGHT: 70 IN

## 2023-05-05 DIAGNOSIS — I27.20 PULMONARY HYPERTENSION: Primary | ICD-10-CM

## 2023-05-05 DIAGNOSIS — I77.810 AORTIC ROOT DILATATION: ICD-10-CM

## 2023-05-05 RX ORDER — ATORVASTATIN CALCIUM 20 MG/1
TABLET, FILM COATED ORAL
Qty: 90 TABLET | Refills: 1 | Status: SHIPPED | OUTPATIENT
Start: 2023-05-05

## 2023-05-05 RX ORDER — PREGABALIN 75 MG/1
CAPSULE ORAL
COMMUNITY
Start: 2023-04-24 | End: 2023-05-05

## 2023-05-05 NOTE — PROGRESS NOTES
Subjective:     Encounter Date:10/26/2022      Patient ID: Joseph Mcdonald is a 67 y.o. male.    Chief Complaint:  Follow-up    HPI:   67 y.o. male with severe obesity, hypertension, hyperlipidemia, diabetes, postcapillary pulmonary hypertension who presents for follow-up.     Patient was last seen October 20, 2022 follow-up after right heart cath.  Since that visit, he unfortunately had a prolonged hospitalization for severe COVID-pneumonia.  He required an ICU stay due to acute hypoxic respiratory failure requiring intubation.  He was extubated in late December and subsequently discharged to rehab where he stayed for 3 weeks. He subsequently underwent PEG tube removal on 3/27/2023.  He presents today for follow-up.  He feels closer to his baseline and has no current complaints.    Per prior note:  Patient had undergone echocardiogram on January 25, 2022 evaluation of shortness of breath at [facility] which revealed normal EF at 70%, mild LVH, normal left atrial pressures, mildly dilated LA, RVSP of 63, mild dilation of the aortic root of 4.1 cm.  He subsequently followed up with his primary care in April 2022 and was noted to have diffuse wheezing and crackles so he was started on Lasix.  He underwent right heart catheterization on 9/12/2022 for evaluation of pulmonary hypertension and that revealed postcapillary pulmonary hypertension with an RA of 9, RV 40/6, pulmonary artery pressure 46/22/34, wedge 23, cardiac output 7.41, PVR 1.48.  He has been compliant with his Lasix and notes frequent urination.  His current weight is 430, 8 pounds lighter than on 9/12/2022.  With respect to his shortness of breath, he does note significant improvement.  He is experiencing mild dyspnea with some exertion.    The following portions of the patient's history were reviewed and updated as appropriate: allergies, current medications, past family history, past medical history, past social history, past surgical history and problem  list.     REVIEW OF SYSTEMS:   All systems reviewed.  Pertinent positives identified in HPI.  All other systems are negative.    Past Medical History:   Diagnosis Date   • Diabetes mellitus    • Hyperlipidemia    • Hypertension        History reviewed. No pertinent family history.    Social History     Socioeconomic History   • Marital status: Single   Tobacco Use   • Smoking status: Former     Packs/day: 0.50     Years: 10.00     Pack years: 5.00     Types: Cigarettes     Quit date: 1970     Years since quittin.3   • Smokeless tobacco: Never   Vaping Use   • Vaping Use: Never used   Substance and Sexual Activity   • Alcohol use: Never   • Drug use: Never   • Sexual activity: Defer       Allergies   Allergen Reactions   • Acetaminophen Anaphylaxis   • Codeine Diarrhea and Nausea And Vomiting   • Penicillins Other (See Comments)   • Sulfa Antibiotics Other (See Comments)       Past Surgical History:   Procedure Laterality Date   • CARDIAC CATHETERIZATION N/A 2022    Procedure: Right Heart Cath;  Surgeon: Frank Rodríguez MD;  Location:  [facility] INV[facility] LOC[facility];  Service: Cardiology;  Laterality: N/A;   • LAPAROSCOPIC GASTRIC BANDING N/A              ECG 12 Lead    Date/Time: 2023 1:11 PM  Performed by: Jose Miguel Handy MD  Authorized by: Jose Miguel Handy MD   Comparison: compared with previous ECG from 10/26/2022  Rhythm: sinus rhythm  Ectopy: unifocal PVCs  Conduction: right bundle branch block    Clinical impression: abnormal EKG               Objective:         Vitals:    23 1247   BP: 108/80   Pulse: 88   SpO2: 93%       PHYSICAL EXAM:  GEN: well appearing, obese, in NAD   HEENT: NCAT, EOMI, moist mucus membranes   Respiratory: Distant breath sounds but no crackles, wheezing  CV: normal rate, regular rhythm, normal S1, S2, no murmurs, rubs, gallops, +2 radial pulses b/l  GI: soft, protuberant, nontender, nondistended  MSK: Trace bilateral edema with chronic venous stasis changes  Skin: no rash,  warm, dry  Heme/Lymph: no bruising or bleeding  Psych: organized thought, normal behavior and affect  Neuro: Alert and Oriented x 3, grossly normal motor function        Assessment:         (I27.20) Pulmonary hypertension    (I77.810) Aortic root dilatation    67 y.o. male with severe obesity, hypertension, hyperlipidemia, diabetes, postcapillary pulmonary hypertension, recent admission with respiratory failure 2/2 COVID who presents for follow-up.          Plan:       #Pulmonary hypertension  Mild postcapillary pulmonary hypertension with mean PA of 34 and PVR of 1.5.  It seems that he has responded well to Lasix.  He has trace residual lower extremity edema.  Transthoracic echo in January without LV dysfunction, or diastolic dysfunction and normal valvular disease.  - Continue Lasix 40 mg daily      #Aortic root dilatation  Echo with aortic root of 4.1 cm  - He had a CTA chest in December at [facility]. No documentation of aortic measurement. Will attempt to obtain CD in order to measure aorta and avoid repeat testing. Pending measurement will decide on further imaging for surveillance, may need TTE in 6 months.    Dr Paredes, thank you very much for referring this kind patient to me. Please call me with any questions or concerns. I will see the patient again in the office in 6 months.         Jose Miguel Handy MD  05/05/23  [facility]    Outpatient Encounter Medications as of 5/5/2023   Medication Sig Dispense Refill   • atorvastatin (LIPITOR) 20 MG tablet Take 1 tablet by mouth Daily. 90 tablet 1   • Blood Glucose Monitoring Suppl (Accu-Chek Guide) w/Device kit      • doxazosin (CARDURA) 4 MG tablet Take 1 tablet by mouth Every Night.     • fexofenadine (ALLEGRA) 180 MG tablet TAKE ONE TABLET BY MOUTH DAILY 30 tablet 3   • furosemide (LASIX) 40 MG tablet TAKE ONE TABLET BY MOUTH TWICE A DAY 40 tablet 1   • glipizide (GLUCOTROL) 5 MG tablet Take 2 tablets by mouth 2 (Two) Times a Day Before Meals.     •  glucose blood test strip Check blood glucose  4 times a day with Accuchek Guide strips DxE11.65.     • losartan (COZAAR) 100 MG tablet TAKE ONE TABLET BY MOUTH EVERY MORNING 30 tablet 3   • metFORMIN (GLUCOPHAGE) 500 MG tablet Take 2 tablets by mouth 2 (Two) Times a Day With Meals. 360 tablet 2   • pantoprazole (PROTONIX) 40 MG EC tablet Take 1 tablet by mouth Daily. 30 tablet 4   • pioglitazone (Actos) 15 MG tablet 1 tablet every morning (Patient taking differently: Take 2 tablets by mouth Daily. 1 tablet every morning) 90 tablet 3   • triamcinolone (KENALOG) 0.1 % ointment      • pregabalin (LYRICA) 75 MG capsule      • [DISCONTINUED] docusate sodium 100 MG capsule Take 1 capsule by mouth Daily.     • [DISCONTINUED] esomeprazole (NexIUM) 40 MG packet      • [DISCONTINUED] HumuLIN R 100 UNIT/ML injection      • [DISCONTINUED] ipratropium-albuterol (DUO-NEB) 0.5-2.5 mg/3 ml nebulizer Inhale 3 mL.     • [DISCONTINUED] Lantus 100 UNIT/ML injection      • [DISCONTINUED] omeprazole OTC (PrilOSEC OTC) 20 MG EC tablet Take 1 tablet p.o. every morning 30 tablet 3     No facility-administered encounter medications on file as of 5/5/2023.

## 2023-05-08 ENCOUNTER — TELEPHONE (OUTPATIENT)
Dept: CARDIOLOGY | Facility: CLINIC | Age: 68
End: 2023-05-08
Payer: MEDICARE

## 2023-05-08 RX ORDER — FUROSEMIDE 40 MG/1
TABLET ORAL
Qty: 40 TABLET | Refills: 1 | Status: SHIPPED | OUTPATIENT
Start: 2023-05-08

## 2023-05-08 NOTE — TELEPHONE ENCOUNTER
We need this Patients Ct Scan he got done in December at [facility].   Please forward to the right department If I did it wrong. Thank you.  Ami

## 2023-05-09 NOTE — TELEPHONE ENCOUNTER
Hub please inform patient    Nely informed:  Patient not able to drive yet.  Keep follow-up appointment and we will discuss.

## 2023-06-09 ENCOUNTER — TELEPHONE (OUTPATIENT)
Dept: FAMILY MEDICINE CLINIC | Facility: CLINIC | Age: 68
End: 2023-06-09

## 2023-06-09 ENCOUNTER — OFFICE VISIT (OUTPATIENT)
Dept: FAMILY MEDICINE CLINIC | Facility: CLINIC | Age: 68
End: 2023-06-09
Payer: MEDICARE

## 2023-06-09 VITALS
BODY MASS INDEX: 45.1 KG/M2 | HEART RATE: 85 BPM | SYSTOLIC BLOOD PRESSURE: 110 MMHG | WEIGHT: 315 LBS | DIASTOLIC BLOOD PRESSURE: 80 MMHG | HEIGHT: 70 IN | OXYGEN SATURATION: 95 %

## 2023-06-09 DIAGNOSIS — I10 HYPERTENSION, UNSPECIFIED TYPE: ICD-10-CM

## 2023-06-09 DIAGNOSIS — E66.01 MORBID OBESITY WITH BMI OF 50.0-59.9, ADULT: ICD-10-CM

## 2023-06-09 DIAGNOSIS — E11.65 TYPE 2 DIABETES MELLITUS WITH HYPERGLYCEMIA, WITHOUT LONG-TERM CURRENT USE OF INSULIN: Primary | ICD-10-CM

## 2023-06-09 LAB
EXPIRATION DATE: ABNORMAL
HBA1C MFR BLD: 7.6 %
Lab: ABNORMAL

## 2023-06-09 RX ORDER — PIOGLITAZONEHYDROCHLORIDE 30 MG/1
30 TABLET ORAL DAILY
COMMUNITY

## 2023-06-09 NOTE — TELEPHONE ENCOUNTER
Caller: Ignacia Mcdonald    Relationship: Brother/Sister    Best call back number: [phone]     What was the call regarding: PATIENT WAS SUPPOSED TO FIND OUT IF HE CAN DRIVE OR NOT DURING APPOINTMENT TODAY 6/9/2023.  PLEASE CALL TO ADVISE.  APPROVED DETAILED VOICEMAIL.

## 2023-06-16 NOTE — PROGRESS NOTES
06/09/2023    Assessment & Plan     This 67-year-old patient presents today for follow-up of diabetes mellitus type 2.    His previous hemoglobin A1c was elevated at 8.1 back on 2/17/2023 he currently is on Glucotrol 5 mg p.o. daily metformin 500 mg 2 tablets twice daily and Actos 30 mg p.o. daily.    His blood pressure is well controlled today at 110/80 in the left arm sitting position standard cuff.  His weight is up 2 pounds from his last visit.    We discussed the importance of getting his hemoglobin A1c to goal at less than 7.  He is status post hospitalization back in November for COPD.  He relates that he is breathing is fine at this point he is not having any shortness of breath.    We will add Januvia to his current regimen to improve his hemoglobin A1c levels.    Diagnoses and all orders for this visit:    1. Type 2 diabetes mellitus with hyperglycemia, without long-term current use of insulin (Primary)  -     POCT glycated hemoglobin, total; Standing  -     POCT glycated hemoglobin, total    2. Morbid obesity with BMI of 50.0-59.9, adult    3. Hypertension, unspecified type    Plan:  1.)  Add Januvia 100 mg 1 tab p.o. daily  2.)  DC glipizide 5 mg.  3.)  Follow-up in 3 months for reevaluation of hypertension and diabetes.         CC: Diabetes (F/U.  Question about Pregabalin (has not started taking yet)---no other issues.   Due for diabetic eye, foot, and microalbumin)  .        HPI  Diabetes  He presents for his follow-up diabetic visit. He has type 2 diabetes mellitus. The initial diagnosis of diabetes was made 10 years ago. His disease course has been worsening. There are no hypoglycemic associated symptoms. Pertinent negatives for hypoglycemia include no dizziness. There are no diabetic associated symptoms. Pertinent negatives for diabetes include no chest pain. There are no hypoglycemic complications. Risk factors for coronary artery disease include male sex, obesity, dyslipidemia, diabetes mellitus,  family history and sedentary lifestyle.      Subjective   Joseph Mcdonald is a 67 y.o. male.      The following portions of the patient's history were reviewed and updated as appropriate: allergies, current medications, past family history, past medical history, past social history, past surgical history, and problem list.    Problem List  Patient Active Problem List   Diagnosis   • Diabetes mellitus type 2, uncontrolled, with complications   • HTN (hypertension)   • Hyperlipidemia   • LAP-BAND surgery status   • Morbid obesity   • Morbid obesity with BMI of 50.0-59.9, adult   • Health care maintenance   • Anasarca   • Bilateral lower extremity edema   • HERMINIA (obstructive sleep apnea)   • Pulmonary hypertension, moderate to severe   • Diabetes mellitus type II, uncontrolled   • Acute hypoxemic respiratory failure   • Acute on chronic diastolic (congestive) heart failure   • Community acquired pneumonia   • COVID-19 virus infection   • Hyponatremia       Past Medical History  Past Medical History:   Diagnosis Date   • Diabetes mellitus    • Hyperlipidemia    • Hypertension        Surgical History  Past Surgical History:   Procedure Laterality Date   • CARDIAC CATHETERIZATION N/A 2022    Procedure: Right Heart Cath;  Surgeon: Frank Rodríguez MD;  Location: [facility] INVASIVE LOCATION;  Service: Cardiology;  Laterality: N/A;   • LAPAROSCOPIC GASTRIC BANDING N/A            Family History  History reviewed. No pertinent family history.    Social History  Social History    Tobacco Use      Smoking status: Former        Packs/day: 0.50        Years: 10.00        Pack years: 5        Types: Cigarettes        Quit date: 1970        Years since quittin.4      Smokeless tobacco: Never       Is the Patient a current tobacco user? No    Allergies  Allergies   Allergen Reactions   • Acetaminophen Anaphylaxis   • Codeine Diarrhea and Nausea And Vomiting   • Penicillins Other (See Comments)   • Sulfa Antibiotics  Other (See Comments)       Current Medications    Current Outpatient Medications:   •  atorvastatin (LIPITOR) 20 MG tablet, TAKE ONE TABLET BY MOUTH DAILY, Disp: 90 tablet, Rfl: 1  •  Continuous Blood Gluc Sensor (FreeStyle Missy 2 Sensor) misc, , Disp: , Rfl:   •  doxazosin (CARDURA) 4 MG tablet, Take 1 tablet by mouth Every Night., Disp: , Rfl:   •  fexofenadine (ALLEGRA) 180 MG tablet, TAKE ONE TABLET BY MOUTH DAILY, Disp: 30 tablet, Rfl: 3  •  furosemide (LASIX) 40 MG tablet, TAKE ONE TABLET BY MOUTH TWICE A DAY, Disp: 40 tablet, Rfl: 1  •  glipizide (GLUCOTROL) 5 MG tablet, Take 2 tablets by mouth 2 (Two) Times a Day Before Meals., Disp: , Rfl:   •  losartan (COZAAR) 100 MG tablet, TAKE ONE TABLET BY MOUTH EVERY MORNING, Disp: 30 tablet, Rfl: 3  •  metFORMIN (GLUCOPHAGE) 500 MG tablet, Take 2 tablets by mouth 2 (Two) Times a Day With Meals., Disp: 360 tablet, Rfl: 2  •  pantoprazole (PROTONIX) 40 MG EC tablet, Take 1 tablet by mouth Daily., Disp: 30 tablet, Rfl: 4  •  pioglitazone (ACTOS) 30 MG tablet, Take 1 tablet by mouth Daily., Disp: , Rfl:   •  triamcinolone (KENALOG) 0.1 % ointment, , Disp: , Rfl:   •  Blood Glucose Monitoring Suppl (Accu-Chek Guide) w/Device kit, , Disp: , Rfl:   •  glucose blood test strip, Check blood glucose  4 times a day with Accuchek Guide strips DxE11.65. (Patient not taking: Reported on 6/9/2023), Disp: , Rfl:   •  pioglitazone (Actos) 15 MG tablet, 1 tablet every morning (Patient taking differently: Take 2 tablets by mouth Daily. 1 tablet every morning), Disp: 90 tablet, Rfl: 3     Review of System  Review of Systems   Constitutional:  Negative for chills and fever.   Respiratory:  Negative for cough and shortness of breath.    Cardiovascular:  Negative for chest pain and palpitations.   Gastrointestinal:  Negative for constipation, diarrhea, nausea and vomiting.   Neurological:  Negative for dizziness and headache.   I have reviewed and confirmed the accuracy of the ROS as  documented by the MA/LPN/RN Dexter Paredes MD    Vitals:    06/09/23 1050   BP: 110/80   Pulse: 85   SpO2: 95%     Body mass index is 59.55 kg/m².    Objective     Physical Exam  Physical Exam  Vitals and nursing note reviewed.   Constitutional:       Appearance: He is well-developed.   HENT:      Head: Normocephalic and atraumatic.   Eyes:      Conjunctiva/sclera: Conjunctivae normal.   Cardiovascular:      Rate and Rhythm: Normal rate and regular rhythm.      Heart sounds: Normal heart sounds.   Pulmonary:      Effort: Pulmonary effort is normal.      Breath sounds: Normal breath sounds.   Abdominal:      General: Bowel sounds are normal.      Palpations: Abdomen is soft.   Musculoskeletal:         General: Normal range of motion.      Cervical back: Normal range of motion and neck supple.   Skin:     General: Skin is warm and dry.   Neurological:      Mental Status: He is alert and oriented to person, place, and time.   Psychiatric:         Behavior: Behavior normal.           Dexter Paredes MD  06/09/2023

## 2023-06-19 ENCOUNTER — TELEPHONE (OUTPATIENT)
Dept: FAMILY MEDICINE CLINIC | Facility: CLINIC | Age: 68
End: 2023-06-19

## 2023-06-19 NOTE — TELEPHONE ENCOUNTER
Caller: Nely Mcdonald    Relationship: Emergency Contact    Best call back number: [phone]     What was the call regarding: PATIENT WAS TAKEN OFF HIS GLIPIZIDE AND PUT ON A NEW MEDICATION, THE PHARMACY HAS NEVER RECEIVED ANYTHING. PLEASE ADVISE WHAT THE MEDICATION IS. CAN DR ROGERS PLEASE RE-SEND IT?

## 2023-08-08 RX ORDER — FUROSEMIDE 40 MG/1
TABLET ORAL
Qty: 40 TABLET | Refills: 1 | Status: SHIPPED | OUTPATIENT
Start: 2023-08-08

## 2023-08-27 DIAGNOSIS — I10 HYPERTENSION, UNSPECIFIED TYPE: ICD-10-CM

## 2023-08-28 RX ORDER — LOSARTAN POTASSIUM 100 MG/1
TABLET ORAL
Qty: 30 TABLET | Refills: 3 | Status: SHIPPED | OUTPATIENT
Start: 2023-08-28

## 2023-09-28 ENCOUNTER — OFFICE VISIT (OUTPATIENT)
Dept: FAMILY MEDICINE CLINIC | Facility: CLINIC | Age: 68
End: 2023-09-28
Payer: MEDICARE

## 2023-09-28 VITALS
HEIGHT: 70 IN | BODY MASS INDEX: 45.1 KG/M2 | DIASTOLIC BLOOD PRESSURE: 70 MMHG | SYSTOLIC BLOOD PRESSURE: 110 MMHG | WEIGHT: 315 LBS

## 2023-09-28 DIAGNOSIS — E10.9 TYPE 1 DIABETES MELLITUS WITHOUT COMPLICATION: ICD-10-CM

## 2023-09-28 RX ORDER — FUROSEMIDE 40 MG/1
40 TABLET ORAL 2 TIMES DAILY
Qty: 180 TABLET | Refills: 2 | Status: SHIPPED | OUTPATIENT
Start: 2023-09-28

## 2023-09-28 RX ORDER — PANTOPRAZOLE SODIUM 40 MG/1
40 TABLET, DELAYED RELEASE ORAL DAILY
Qty: 30 TABLET | Refills: 4 | Status: SHIPPED | OUTPATIENT
Start: 2023-09-28

## 2023-09-28 NOTE — PROGRESS NOTES
2023    Assessment & Plan   Diagnoses and all orders for this visit:    1. Type 1 diabetes mellitus without complication  Comments:  hemoglobin A1c greater than 8.5, patient not at goal             CC: Diabetes (F/U.  Last A1c=8.6 on 23.---no other issues)  .        HPI  History of Present Illness     Subjective   Joseph Mcdonald is a 67 y.o. male.      The following portions of the patient's history were reviewed and updated as appropriate: allergies, current medications, past family history, past medical history, past social history, past surgical history, and problem list.    Problem List  Patient Active Problem List   Diagnosis    Diabetes mellitus type 2, uncontrolled, with complications    HTN (hypertension)    Hyperlipidemia    LAP-BAND surgery status    Morbid obesity    Morbid obesity with BMI of 50.0-59.9, adult    Health care maintenance    Anasarca    Bilateral lower extremity edema    HERMINIA (obstructive sleep apnea)    Pulmonary hypertension, moderate to severe    Diabetes mellitus type II, uncontrolled    Acute hypoxemic respiratory failure    Acute on chronic diastolic (congestive) heart failure    Community acquired pneumonia    COVID-19 virus infection    Hyponatremia       Past Medical History  Past Medical History:   Diagnosis Date    Diabetes mellitus     Hyperlipidemia     Hypertension        Surgical History  Past Surgical History:   Procedure Laterality Date    CARDIAC CATHETERIZATION N/A 2022    Procedure: Right Heart Cath;  Surgeon: Frank Rodríguez MD;  Location: [facility] CATH INVASIVE LOCATION;  Service: Cardiology;  Laterality: N/A;    LAPAROSCOPIC GASTRIC BANDING N/A            Family History  History reviewed. No pertinent family history.    Social History  Social History    Tobacco Use      Smoking status: Former        Packs/day: 0.50        Years: 10.00        Pack years: 5        Types: Cigarettes        Quit date: 1970        Years since quittin.7      Smokeless  tobacco: Never       Is the Patient a current tobacco user? No    Allergies  Allergies   Allergen Reactions    Acetaminophen Anaphylaxis    Codeine Diarrhea and Nausea And Vomiting    Penicillins Other (See Comments)    Sulfa Antibiotics Other (See Comments)       Current Medications    Current Outpatient Medications:     atorvastatin (LIPITOR) 20 MG tablet, TAKE ONE TABLET BY MOUTH DAILY, Disp: 90 tablet, Rfl: 1    Continuous Blood Gluc Sensor (FreeStyle Missy 2 Sensor) misc, , Disp: , Rfl:     doxazosin (CARDURA) 4 MG tablet, TAKE ONE TABLET BY MOUTH ONCE NIGHTLY, Disp: 90 tablet, Rfl: 2    fexofenadine (ALLEGRA) 180 MG tablet, TAKE ONE TABLET BY MOUTH DAILY, Disp: 30 tablet, Rfl: 3    furosemide (LASIX) 40 MG tablet, TAKE ONE TABLET BY MOUTH TWICE A DAY, Disp: 40 tablet, Rfl: 1    losartan (COZAAR) 100 MG tablet, TAKE ONE TABLET BY MOUTH EVERY MORNING, Disp: 30 tablet, Rfl: 3    metFORMIN (GLUCOPHAGE) 500 MG tablet, Take 2 tablets by mouth 2 (Two) Times a Day With Meals., Disp: 360 tablet, Rfl: 2    pantoprazole (PROTONIX) 40 MG EC tablet, Take 1 tablet by mouth Daily., Disp: 30 tablet, Rfl: 4    pioglitazone (ACTOS) 30 MG tablet, Take 1 tablet by mouth Daily., Disp: , Rfl:     SITagliptin (Januvia) 100 MG tablet, Take 1 tablet by mouth Daily., Disp: 30 tablet, Rfl: 4    triamcinolone (KENALOG) 0.1 % ointment, , Disp: , Rfl:     Blood Glucose Monitoring Suppl (Accu-Chek Guide) w/Device kit, , Disp: , Rfl:     glipizide (GLUCOTROL) 5 MG tablet, Take 2 tablets by mouth 2 (Two) Times a Day Before Meals., Disp: , Rfl:     glucose blood test strip, Check blood glucose  4 times a day with Accuchek Guide strips DxE11.65. (Patient not taking: Reported on 6/9/2023), Disp: , Rfl:     pioglitazone (Actos) 15 MG tablet, 1 tablet every morning (Patient taking differently: Take 2 tablets by mouth Daily. 1 tablet every morning), Disp: 90 tablet, Rfl: 3     Review of System  Review of Systems  I have reviewed and confirmed the  accuracy of the ROS as documented by the MA/LPN/RN Dexter Paredes MD    Vitals:    09/28/23 1535   BP: 110/70     Body mass index is 62.13 kg/m².    Objective     Physical Exam  Physical Exam        Dexter Paredes MD  09/28/2023

## 2023-09-28 NOTE — PROGRESS NOTES
09/28/2023    Assessment & Plan   This 67-year-old patient presents today for follow-up Of diabetes.  He is currently being seen by Dr. GOMEZ, endocrinologist at [facility] for poorly controlled diabetes.  Patient's last hemoglobin A1c done 2 months ago was elevated at 7.3 his prior level was 6.9.  He currently is on metformin 500 mg 2 tabs p.o. twice daily and Januvia 100 mg p.o. daily.  He was on Farxiga and Tradjenta at times but had problems with abdominal pain and diarrhea and was taken off them.    His blood pressure is well controlled today at 110/70 in the left arm sitting position standard cuff.    His weight is up 18 pounds from his last visit up to 433.  Patient relates that he realizes his diet as it is a problem but he is just not able to control it at this point.    Patient was here today for follow-up continuous glucometer I have the patient relates it has been malfunctioning for the past several days and so we cannot review it.    In addition to the Januvia and metformin Dr. Tavarez also has him on Actos 30 mg and Glucotrol 5 mg 2 tablets twice daily.      Diagnoses and all orders for this visit:    1. Type 1 diabetes mellitus without complication  Comments:  hemoglobin A1c greater than 8.5, patient not at goal             CC: Diabetes (F/U.  Last A1c=8.6 on 7/14/23.---no other issues)  .        HPI  Diabetes  Pertinent negatives for hypoglycemia include no dizziness. Pertinent negatives for diabetes include no chest pain.      Subjective   Joseph Mcdonald is a 67 y.o. male.      The following portions of the patient's history were reviewed and updated as appropriate: allergies, current medications, past family history, past medical history, past social history, past surgical history, and problem list.    Problem List  Patient Active Problem List   Diagnosis    Diabetes mellitus type 2, uncontrolled, with complications    HTN (hypertension)    Hyperlipidemia    LAP-BAND surgery status    Morbid obesity     Morbid obesity with BMI of 50.0-59.9, adult    Health care maintenance    Anasarca    Bilateral lower extremity edema    HERMINIA (obstructive sleep apnea)    Pulmonary hypertension, moderate to severe    Diabetes mellitus type II, uncontrolled    Acute hypoxemic respiratory failure    Acute on chronic diastolic (congestive) heart failure    Community acquired pneumonia    COVID-19 virus infection    Hyponatremia       Past Medical History  Past Medical History:   Diagnosis Date    Diabetes mellitus     Hyperlipidemia     Hypertension        Surgical History  Past Surgical History:   Procedure Laterality Date    CARDIAC CATHETERIZATION N/A 2022    Procedure: Right Heart Cath;  Surgeon: Frank Rodríguez MD;  Location:  [facility] CATH INVASIVE LOCATION;  Service: Cardiology;  Laterality: N/A;    LAPAROSCOPIC GASTRIC BANDING N/A            Family History  History reviewed. No pertinent family history.    Social History  Social History    Tobacco Use      Smoking status: Former        Packs/day: 0.50        Years: 10.00        Pack years: 5        Types: Cigarettes        Quit date:         Years since quittin.7      Smokeless tobacco: Never       Is the Patient a current tobacco user? No in addition to the metformin and    Allergies  Allergies   Allergen Reactions    Acetaminophen Anaphylaxis    Codeine Diarrhea and Nausea And Vomiting    Penicillins Other (See Comments)    Sulfa Antibiotics Other (See Comments)       Current Medications    Current Outpatient Medications:     atorvastatin (LIPITOR) 20 MG tablet, TAKE ONE TABLET BY MOUTH DAILY, Disp: 90 tablet, Rfl: 1    Continuous Blood Gluc Sensor (FreeStyle Missy 2 Sensor) misc, , Disp: , Rfl:     doxazosin (CARDURA) 4 MG tablet, TAKE ONE TABLET BY MOUTH ONCE NIGHTLY, Disp: 90 tablet, Rfl: 2    fexofenadine (ALLEGRA) 180 MG tablet, TAKE ONE TABLET BY MOUTH DAILY, Disp: 30 tablet, Rfl: 3    furosemide (LASIX) 40 MG tablet, TAKE ONE TABLET BY MOUTH TWICE A  DAY, Disp: 40 tablet, Rfl: 1    losartan (COZAAR) 100 MG tablet, TAKE ONE TABLET BY MOUTH EVERY MORNING, Disp: 30 tablet, Rfl: 3    metFORMIN (GLUCOPHAGE) 500 MG tablet, Take 2 tablets by mouth 2 (Two) Times a Day With Meals., Disp: 360 tablet, Rfl: 2    pantoprazole (PROTONIX) 40 MG EC tablet, Take 1 tablet by mouth Daily., Disp: 30 tablet, Rfl: 4    pioglitazone (ACTOS) 30 MG tablet, Take 1 tablet by mouth Daily., Disp: , Rfl:     SITagliptin (Januvia) 100 MG tablet, Take 1 tablet by mouth Daily., Disp: 30 tablet, Rfl: 4    triamcinolone (KENALOG) 0.1 % ointment, , Disp: , Rfl:     Blood Glucose Monitoring Suppl (Accu-Chek Guide) w/Device kit, , Disp: , Rfl:     glipizide (GLUCOTROL) 5 MG tablet, Take 2 tablets by mouth 2 (Two) Times a Day Before Meals., Disp: , Rfl:     glucose blood test strip, Check blood glucose  4 times a day with Accuchek Guide strips DxE11.65. (Patient not taking: Reported on 6/9/2023), Disp: , Rfl:     pioglitazone (Actos) 15 MG tablet, 1 tablet every morning (Patient taking differently: Take 2 tablets by mouth Daily. 1 tablet every morning), Disp: 90 tablet, Rfl: 3     Review of System  Review of Systems   Constitutional:  Negative for chills and fever.   Respiratory:  Negative for cough and shortness of breath.    Cardiovascular:  Negative for chest pain and palpitations.   Gastrointestinal:  Negative for constipation, diarrhea, nausea and vomiting.   Neurological:  Negative for dizziness and headache.   I have reviewed and confirmed the accuracy of the ROS as documented by the MA/LPN/RN Dexter Paredes MD    Vitals:    09/28/23 1535   BP: 110/70     Body mass index is 62.13 kg/m².    Objective     Physical Exam  Physical Exam  Vitals and nursing note reviewed.   Constitutional:       Appearance: He is well-developed. He is obese.   HENT:      Head: Normocephalic and atraumatic.   Eyes:      Conjunctiva/sclera: Conjunctivae normal.   Cardiovascular:      Rate and Rhythm: Normal rate  and regular rhythm.      Heart sounds: Normal heart sounds.   Pulmonary:      Effort: Pulmonary effort is normal.      Breath sounds: Normal breath sounds.   Abdominal:      General: Bowel sounds are normal.      Palpations: Abdomen is soft.   Musculoskeletal:         General: Normal range of motion.      Cervical back: Neck supple.   Skin:     General: Skin is warm and dry.   Neurological:      Mental Status: He is alert and oriented to person, place, and time.   Psychiatric:         Behavior: Behavior normal.           Dexter Paredes MD  09/28/2023

## 2023-10-06 RX ORDER — PANTOPRAZOLE SODIUM 40 MG/1
TABLET, DELAYED RELEASE ORAL
Qty: 30 TABLET | Refills: 4 | OUTPATIENT
Start: 2023-10-06

## 2023-10-06 RX ORDER — FUROSEMIDE 40 MG/1
TABLET ORAL
Qty: 40 TABLET | OUTPATIENT
Start: 2023-10-06

## 2023-12-26 DIAGNOSIS — J30.1 ALLERGIC RHINITIS DUE TO POLLEN, UNSPECIFIED SEASONALITY: ICD-10-CM

## 2023-12-26 RX ORDER — FEXOFENADINE HCL 180 MG/1
TABLET ORAL
Qty: 30 TABLET | Refills: 3 | Status: SHIPPED | OUTPATIENT
Start: 2023-12-26

## 2024-01-02 ENCOUNTER — OFFICE VISIT (OUTPATIENT)
Dept: FAMILY MEDICINE CLINIC | Facility: CLINIC | Age: 69
End: 2024-01-02
Payer: MEDICARE

## 2024-01-02 VITALS
WEIGHT: 315 LBS | HEIGHT: 70 IN | DIASTOLIC BLOOD PRESSURE: 76 MMHG | BODY MASS INDEX: 45.1 KG/M2 | SYSTOLIC BLOOD PRESSURE: 114 MMHG

## 2024-01-02 DIAGNOSIS — G47.33 OSA (OBSTRUCTIVE SLEEP APNEA): ICD-10-CM

## 2024-01-02 DIAGNOSIS — I10 PRIMARY HYPERTENSION: ICD-10-CM

## 2024-01-02 DIAGNOSIS — E11.65 TYPE 2 DIABETES MELLITUS WITH HYPERGLYCEMIA, WITHOUT LONG-TERM CURRENT USE OF INSULIN: ICD-10-CM

## 2024-01-02 DIAGNOSIS — Z00.00 ENCOUNTER FOR SUBSEQUENT ANNUAL WELLNESS VISIT (AWV) IN MEDICARE PATIENT: Primary | ICD-10-CM

## 2024-01-02 DIAGNOSIS — Z23 NEED FOR INFLUENZA VACCINATION: ICD-10-CM

## 2024-01-02 RX ORDER — ASPIRIN 81 MG/1
81 TABLET ORAL AS NEEDED
COMMUNITY

## 2024-01-02 RX ORDER — ALBUTEROL SULFATE 1.25 MG/3ML
1 SOLUTION RESPIRATORY (INHALATION) EVERY 6 HOURS PRN
Qty: 50 EACH | Refills: 12 | Status: SHIPPED | OUTPATIENT
Start: 2024-01-02

## 2024-01-02 NOTE — PROGRESS NOTES
Come on the ABCs of the Annual Wellness Visit  Subsequent Medicare Wellness Visit    Chief Complaint   Patient presents with    Medicare Wellness-subsequent     No other issues      Subjective    History of Present Illness:  Joseph Mcdonald is a 68 y.o. male who presents for a Subsequent Medicare Wellness Visit.    The following portions of the patient's history were reviewed and   updated as appropriate: allergies, current medications, past family history, past medical history, past social history, past surgical history, and problem list.    Compared to one year ago, the patient feels his physical   health is worse.    Compared to one year ago, the patient feels his mental   health is the same.    Recent Hospitalizations:  He was not admitted to the hospital during the last year.       Current Medical Providers:  Patient Care Team:  Dexter Paredes MD as PCP - General (Internal Medicine)  Zach Obrien MD as Consulting Physician (Endocrinology)    Outpatient Medications Prior to Visit   Medication Sig Dispense Refill    aspirin 81 MG EC tablet Take 1 tablet by mouth As Needed.      atorvastatin (LIPITOR) 20 MG tablet TAKE ONE TABLET BY MOUTH DAILY 90 tablet 1    doxazosin (CARDURA) 4 MG tablet TAKE ONE TABLET BY MOUTH ONCE NIGHTLY 90 tablet 2    fexofenadine (ALLEGRA) 180 MG tablet TAKE ONE TABLET BY MOUTH DAILY 30 tablet 3    furosemide (LASIX) 40 MG tablet Take 1 tablet by mouth 2 (Two) Times a Day. 180 tablet 2    losartan (COZAAR) 100 MG tablet TAKE ONE TABLET BY MOUTH EVERY MORNING 30 tablet 3    metFORMIN (GLUCOPHAGE) 500 MG tablet Take 2 tablets by mouth 2 (Two) Times a Day With Meals. 360 tablet 2    pantoprazole (PROTONIX) 40 MG EC tablet Take 1 tablet by mouth Daily. 30 tablet 4    pioglitazone (ACTOS) 30 MG tablet Take 1 tablet by mouth Daily.      SITagliptin (Januvia) 100 MG tablet Take 1 tablet by mouth Daily. 30 tablet 4    triamcinolone (KENALOG) 0.1 % ointment       Blood Glucose Monitoring  Suppl (Accu-Chek Guide) w/Device kit  (Patient not taking: Reported on 6/9/2023)      Continuous Blood Gluc Sensor (FreeStyle Missy 2 Sensor) misc       glipizide (GLUCOTROL) 5 MG tablet Take 2 tablets by mouth 2 (Two) Times a Day Before Meals.      glucose blood test strip Check blood glucose  4 times a day with Accuchek Guide strips DxE11.65. (Patient not taking: Reported on 6/9/2023)      pioglitazone (Actos) 15 MG tablet 1 tablet every morning (Patient taking differently: Take 2 tablets by mouth Daily. 1 tablet every morning) 90 tablet 3     No facility-administered medications prior to visit.       No opioid medication identified on active medication list. I have reviewed chart for other potential  high risk medication/s and harmful drug interactions in the elderly.        Aspirin is on active medication list. Aspirin use is not indicated based on review of current medical condition/s. Risk of harm outweighs potential benefits. Patient instructed to discontinue this medication.  .      Patient Active Problem List   Diagnosis    Diabetes mellitus type 2, uncontrolled, with complications    HTN (hypertension)    Hyperlipidemia    LAP-BAND surgery status    Morbid obesity    Morbid obesity with BMI of 50.0-59.9, adult    Health care maintenance    Anasarca    Bilateral lower extremity edema    HERMINIA (obstructive sleep apnea)    Pulmonary hypertension, moderate to severe    Diabetes mellitus type II, uncontrolled    Acute hypoxemic respiratory failure    Acute on chronic diastolic (congestive) heart failure    Community acquired pneumonia    COVID-19 virus infection    Hyponatremia     Advance Care Planning  Advance Directive is not on file.  ACP discussion was held with the patient during this visit. Patient does not have an advance directive, information provided.    Review of Systems   Constitutional: Negative.    HENT: Negative.     Eyes: Negative.    Respiratory: Negative.     Cardiovascular: Negative.   "  Gastrointestinal: Negative.    Musculoskeletal: Negative.    Skin: Negative.    Psychiatric/Behavioral: Negative.           Objective    Vitals:    24 0925   BP: 114/76   Weight: (!) 206 kg (455 lb)   Height: 177.8 cm (70\")     BMI Readings from Last 1 Encounters:   24 65.29 kg/m²   BMI is above normal parameters. Recommendations include: educational material and Information on healthy weight added to patient's after visit summary    Does the patient have evidence of cognitive impairment? No    Physical Exam  Vitals and nursing note reviewed.   Constitutional:       Appearance: He is well-developed.   HENT:      Head: Normocephalic and atraumatic.   Eyes:      Conjunctiva/sclera: Conjunctivae normal.      Pupils: Pupils are equal, round, and reactive to light.   Cardiovascular:      Rate and Rhythm: Normal rate and regular rhythm.      Heart sounds: Normal heart sounds.   Pulmonary:      Effort: Pulmonary effort is normal.      Breath sounds: Normal breath sounds.   Abdominal:      General: Bowel sounds are normal.      Palpations: Abdomen is soft.   Musculoskeletal:         General: Normal range of motion.      Cervical back: Normal range of motion and neck supple.   Skin:     General: Skin is warm.   Neurological:      Mental Status: He is alert.   Psychiatric:         Behavior: Behavior normal.         Thought Content: Thought content normal.         Judgment: Judgment normal.       Lab Results   Component Value Date    HGBA1C 9.2 (H) 10/27/2023            HEALTH RISK ASSESSMENT    Smoking Status:  Social History     Tobacco Use   Smoking Status Former    Packs/day: 0.50    Years: 10.00    Additional pack years: 0.00    Total pack years: 5.00    Types: Cigarettes    Quit date: 1970    Years since quittin.0   Smokeless Tobacco Never     Alcohol Consumption:  Social History     Substance and Sexual Activity   Alcohol Use Never     Fall Risk Screen:    STEADI Fall Risk Assessment was completed, " and patient is at LOW risk for falls.Assessment completed on:2024    Depression Screenin/2/2024     9:54 AM   PHQ-2/PHQ-9 Depression Screening   Little Interest or Pleasure in Doing Things 0-->not at all   Feeling Down, Depressed or Hopeless 0-->not at all   PHQ-9: Brief Depression Severity Measure Score 0       Health Habits and Functional and Cognitive Screenin/2/2024     9:54 AM   Functional & Cognitive Status   Do you have difficulty preparing food and eating? No   Do you have difficulty bathing yourself, getting dressed or grooming yourself? No   Do you have difficulty using the toilet? No   Do you have difficulty moving around from place to place? No   Do you have trouble with steps or getting out of a bed or a chair? No   Dental Exam Not up to date   Eye Exam Not up to date   Do you need help using the phone?  No   Are you deaf or do you have serious difficulty hearing?  No   Do you need help to go to places out of walking distance? No   Do you need help shopping? No   Do you need help preparing meals?  No   Do you need help with housework?  No   Do you need help with laundry? No   Do you need help taking your medications? No   Do you need help managing money? No   Do you ever drive or ride in a car without wearing a seat belt? No   Have you felt unusual stress, anger or loneliness in the last month? No   Who do you live with? Alone   If you need help, do you have trouble finding someone available to you? No   Have you been bothered in the last four weeks by sexual problems? No   Do you have difficulty concentrating, remembering or making decisions? No       Age-appropriate Screening Schedule:  Refer to the list below for future screening recommendations based on patient's age, sex and/or medical conditions. Orders for these recommended tests are listed in the plan section. The patient has been provided with a written plan.    Health Maintenance   Topic Date Due    BMI FOLLOWUP  Never  done    COLORECTAL CANCER SCREENING  Never done    COVID-19 Vaccine (1) Never done    Pneumococcal Vaccine 65+ (1 of 2 - PCV) Never done    TDAP/TD VACCINES (1 - Tdap) Never done    HEPATITIS C SCREENING  Never done    INFLUENZA VACCINE  08/01/2023    DIABETIC FOOT EXAM  01/17/2024 (Originally 5/11/2022)    URINE MICROALBUMIN  01/20/2024 (Originally 5/11/2022)    ZOSTER VACCINE (1 of 2) 01/26/2024 (Originally 10/3/2007)    DIABETIC EYE EXAM  02/20/2024 (Originally 12/9/2020)    HEMOGLOBIN A1C  04/27/2024    LIPID PANEL  10/27/2024    ANNUAL WELLNESS VISIT  01/02/2025    AAA SCREEN (ONE-TIME)  Completed              Assessment & Plan     This 68-year-old retired patient presents today for Medicare wellness review.    Regarding anticipatory guidance.  We discussed the importance of keeping his LDL cholesterol less than 70.  He is currently being seen by Dr. Ron LARSEN, endocrinologist for poorly controlled diabetes.  His last visit back on 10/27/2023 shows that his A1c had increased up to 9.2.  Note is made the patient has picked up 22 pounds over the past 3 months.  He is scheduled follow-up with Dr. Tavarez's group in the next month or so.  We have asked him to touch bases with him regarding suitability of Rybelsus in his situation to help him lose weight and get better control of his diabetes.    His blood pressure shows good control at 114/76 in the left arm sitting position standard cuff.    His at last LDL done on 10/27/2023 was slightly elevated at 83.    As part of his Medicare wellness review we gave him a low-cholesterol diet sheet with instructions to follow it at our direction.        CMS Preventative Services Quick Reference  Risk Factors Identified During Encounter  None Identified  The above risks/problems have been discussed with the patient.  Follow up actions/plans if indicated are seen below in the Assessment/Plan Section.  Pertinent information has been shared with the patient in the After Visit  Summary.    There are no diagnoses linked to this encounter.    Class 3 Severe Obesity (BMI >=40). Obesity-related health conditions include the following: hypertension, diabetes mellitus, dyslipidemias, and osteoarthritis. Obesity is worsening. BMI is is above average; BMI management plan is completed. We discussed portion control and increasing exercise.       Follow Up:   No follow-ups on file.     An After Visit Summary and PPPS were made available to the patient.

## 2024-02-05 RX ORDER — SITAGLIPTIN 100 MG/1
100 TABLET, FILM COATED ORAL DAILY
Qty: 30 TABLET | Refills: 0 | Status: SHIPPED | OUTPATIENT
Start: 2024-02-05

## 2024-02-05 NOTE — TELEPHONE ENCOUNTER
Rx Refill Note  Requested Prescriptions     Pending Prescriptions Disp Refills    Januvia 100 MG tablet [Pharmacy Med Name: JANUVIA 100 MG TABLET] 30 tablet 4     Sig: TAKE 1 TABLET BY MOUTH DAILY      Last office visit with prescribing clinician: 1/2/2024   Last telemedicine visit with prescribing clinician: Visit date not found   Next office visit with prescribing clinician: 4/4/2024                         Would you like a call back once the refill request has been completed: [] Yes [] No    If the office needs to give you a call back, can they leave a voicemail: [] Yes [] No    Justus Morin MA  02/05/24, 07:56 EST

## 2024-02-07 DIAGNOSIS — I10 HYPERTENSION, UNSPECIFIED TYPE: ICD-10-CM

## 2024-02-08 RX ORDER — LOSARTAN POTASSIUM 100 MG/1
TABLET ORAL
Qty: 30 TABLET | Refills: 0 | Status: SHIPPED | OUTPATIENT
Start: 2024-02-08

## 2024-03-26 DIAGNOSIS — I10 HYPERTENSION, UNSPECIFIED TYPE: ICD-10-CM

## 2024-03-27 RX ORDER — LOSARTAN POTASSIUM 100 MG/1
100 TABLET ORAL EVERY MORNING
Qty: 30 TABLET | Refills: 0 | Status: SHIPPED | OUTPATIENT
Start: 2024-03-27

## 2024-03-29 ENCOUNTER — TELEPHONE (OUTPATIENT)
Dept: FAMILY MEDICINE CLINIC | Facility: CLINIC | Age: 69
End: 2024-03-29
Payer: MEDICARE

## 2024-03-29 NOTE — TELEPHONE ENCOUNTER
Caller: Joseph Mcdonald    Relationship: Self    Best call back number: [phone]     What orders are you requesting (i.e. lab or imaging): ORDERS FOR A WHEELCHAIR REPAIR    Additional notes: PATIENT SPOKE WITH JOSE MIGUEL WHO PROVIDED THEIR FAX.    HERE IS THE NUMBER FOR [facility]. [phone]

## 2024-04-04 ENCOUNTER — OFFICE VISIT (OUTPATIENT)
Dept: FAMILY MEDICINE CLINIC | Facility: CLINIC | Age: 69
End: 2024-04-04
Payer: MEDICARE

## 2024-04-04 VITALS
SYSTOLIC BLOOD PRESSURE: 128 MMHG | HEIGHT: 70 IN | DIASTOLIC BLOOD PRESSURE: 72 MMHG | BODY MASS INDEX: 45.1 KG/M2 | WEIGHT: 315 LBS

## 2024-04-04 DIAGNOSIS — I10 HYPERTENSION, UNSPECIFIED TYPE: Primary | ICD-10-CM

## 2024-04-04 PROCEDURE — 3074F SYST BP LT 130 MM HG: CPT | Performed by: INTERNAL MEDICINE

## 2024-04-04 PROCEDURE — 3078F DIAST BP <80 MM HG: CPT | Performed by: INTERNAL MEDICINE

## 2024-04-04 PROCEDURE — 1159F MED LIST DOCD IN RCRD: CPT | Performed by: INTERNAL MEDICINE

## 2024-04-04 PROCEDURE — 1160F RVW MEDS BY RX/DR IN RCRD: CPT | Performed by: INTERNAL MEDICINE

## 2024-04-04 PROCEDURE — 99214 OFFICE O/P EST MOD 30 MIN: CPT | Performed by: INTERNAL MEDICINE

## 2024-04-04 RX ORDER — PIOGLITAZONEHYDROCHLORIDE 45 MG/1
45 TABLET ORAL DAILY
COMMUNITY
Start: 2024-01-25 | End: 2025-01-24

## 2024-04-04 NOTE — PROGRESS NOTES
04/04/2024    Assessment & Plan   This 68-year-old patient presents at this time for follow-up of hypertension.  He relates he is feeling fine he has some questions regarding exercise equipment that he would like to purchase.  He relates he is taking his blood pressure medicine as prescribed.  He denies any shortness of breath.  He still ambulating with the assistance of a quad walker.  Objective findings blood pressure was initially 128/72 on recheck by me in the left arm sitting position standard cuff it was 120/80.  He relates that his will.  Chair people are working on his wheelchair and they said that they would need an order from me regarding exercise equipment.  Unfortunately cannot remember who it is requested at and what the specific request was.  We have asked him to provide us any paperwork or give us the name of someone who is directing him to us.    Patient is in need of a Cologuard test now.      Diagnoses and all orders for this visit:    1. Hypertension, unspecified type (Primary)           Plan:  1.)  Continue losartan 100 mg p.o. every morning for hypertension control       CC: Hypertension (F/U.  Discuss exercise equipment.---no other issues)  .        HPI  History of Present Illness     Subjective   Joseph Mcdonald is a 68 y.o. male.      The following portions of the patient's history were reviewed and updated as appropriate: allergies, current medications, past family history, past medical history, past social history, past surgical history, and problem list.    Problem List  Patient Active Problem List   Diagnosis    Diabetes mellitus type 2, uncontrolled, with complications    HTN (hypertension)    Hyperlipidemia    LAP-BAND surgery status    Morbid obesity    Morbid obesity with BMI of 50.0-59.9, adult    Health care maintenance    Anasarca    Bilateral lower extremity edema    HERMINIA (obstructive sleep apnea)    Pulmonary hypertension, moderate to severe    Diabetes mellitus type II,  uncontrolled    Acute hypoxemic respiratory failure    Acute on chronic diastolic (congestive) heart failure    Community acquired pneumonia    COVID-19 virus infection    Hyponatremia       Past Medical History  Past Medical History:   Diagnosis Date    Diabetes mellitus     Hyperlipidemia     Hypertension        Surgical History  Past Surgical History:   Procedure Laterality Date    CARDIAC CATHETERIZATION N/A 2022    Procedure: Right Heart Cath;  Surgeon: Frank Rodríguez MD;  Location:  [facility]AS[facility];  Service: Cardiology;  Laterality: N/A;    LAPAROSCOPIC GASTRIC BANDING N/A            Family History  History reviewed. No pertinent family history.    Social History  Social History    Tobacco Use      Smoking status: Former        Packs/day: 0.00        Years: 0.5 packs/day for 10.0 years (5.0 ttl pk-yrs)        Types: Cigarettes        Start date:         Quit date:         Years since quittin.2      Smokeless tobacco: Never       Is the Patient a current tobacco user? No    Allergies  Allergies   Allergen Reactions    Acetaminophen Anaphylaxis    Codeine Diarrhea and Nausea And Vomiting    Penicillins Other (See Comments)    Sulfa Antibiotics Other (See Comments)       Current Medications    Current Outpatient Medications:     albuterol (ACCUNEB) 1.25 MG/3ML nebulizer solution, Take 3 mL by nebulization Every 6 (Six) Hours As Needed for Shortness of Air., Disp: 50 each, Rfl: 12    aspirin 81 MG EC tablet, Take 1 tablet by mouth As Needed., Disp: , Rfl:     atorvastatin (LIPITOR) 20 MG tablet, TAKE ONE TABLET BY MOUTH DAILY, Disp: 90 tablet, Rfl: 1    Continuous Blood Gluc Sensor (FreeStyle Missy 2 Sensor) misc, , Disp: , Rfl:     doxazosin (CARDURA) 4 MG tablet, TAKE ONE TABLET BY MOUTH ONCE NIGHTLY, Disp: 90 tablet, Rfl: 2    fexofenadine (ALLEGRA) 180 MG tablet, TAKE ONE TABLET BY MOUTH DAILY, Disp: 30 tablet, Rfl: 3    furosemide (LASIX) 40 MG tablet, Take 1 tablet by  mouth 2 (Two) Times a Day., Disp: 180 tablet, Rfl: 2    losartan (COZAAR) 100 MG tablet, Take 1 tablet by mouth Every Morning., Disp: 30 tablet, Rfl: 0    metFORMIN (GLUCOPHAGE) 500 MG tablet, Take 2 tablets by mouth 2 (Two) Times a Day With Meals., Disp: 360 tablet, Rfl: 2    pantoprazole (PROTONIX) 40 MG EC tablet, Take 1 tablet by mouth Daily., Disp: 30 tablet, Rfl: 4    pioglitazone (ACTOS) 45 MG tablet, Take 1 tablet by mouth Daily., Disp: , Rfl:     SITagliptin (Januvia) 100 MG tablet, Take 1 tablet by mouth Daily., Disp: 30 tablet, Rfl: 0    triamcinolone (KENALOG) 0.1 % ointment, , Disp: , Rfl:     glipizide (GLUCOTROL) 5 MG tablet, Take 2 tablets by mouth 2 (Two) Times a Day Before Meals., Disp: , Rfl:      Review of System  Review of Systems  I have reviewed and confirmed the accuracy of the ROS as documented by the MA/LPN/RN Dexter Paredes MD    Vitals:    04/04/24 1510   BP: 128/72     Body mass index is 64.74 kg/m².    Objective     Physical Exam  Physical Exam        Dexter Paredes MD  04/04/2024

## 2024-04-25 NOTE — TELEPHONE ENCOUNTER
Name: Joseph Mcdonald    Relationship: Self    Best Callback Number: [phone]    HUB PROVIDED THE RELAY MESSAGE FROM THE OFFICE   PATIENT HAS FURTHER QUESTIONS AND WOULD LIKE A CALL BACK AT THE FOLLOWING PHONE NUMBER [phone]    ADDITIONAL INFORMATION: PATIENT WOULD LIKE TO KNOW IF THERE IS AN UPDATE WITH THE EXERCISE EQUIPMENT.    PLEASE CALL.

## 2024-04-26 RX ORDER — PANTOPRAZOLE SODIUM 40 MG/1
40 TABLET, DELAYED RELEASE ORAL DAILY
Qty: 30 TABLET | Refills: 4 | Status: SHIPPED | OUTPATIENT
Start: 2024-04-26

## 2024-05-13 DIAGNOSIS — I10 HYPERTENSION, UNSPECIFIED TYPE: ICD-10-CM

## 2024-05-14 RX ORDER — ATORVASTATIN CALCIUM 20 MG/1
TABLET, FILM COATED ORAL
Qty: 90 TABLET | Refills: 1 | Status: SHIPPED | OUTPATIENT
Start: 2024-05-14

## 2024-05-14 RX ORDER — SITAGLIPTIN 100 MG/1
100 TABLET, FILM COATED ORAL DAILY
Qty: 30 TABLET | Refills: 0 | Status: SHIPPED | OUTPATIENT
Start: 2024-05-14

## 2024-05-14 RX ORDER — LOSARTAN POTASSIUM 100 MG/1
100 TABLET ORAL EVERY MORNING
Qty: 30 TABLET | Refills: 0 | Status: SHIPPED | OUTPATIENT
Start: 2024-05-14

## 2024-06-11 RX ORDER — DOXAZOSIN MESYLATE 4 MG/1
TABLET ORAL
Qty: 90 TABLET | Refills: 2 | Status: SHIPPED | OUTPATIENT
Start: 2024-06-11

## 2024-07-01 DIAGNOSIS — I10 HYPERTENSION, UNSPECIFIED TYPE: ICD-10-CM

## 2024-07-01 RX ORDER — SITAGLIPTIN 100 MG/1
100 TABLET, FILM COATED ORAL DAILY
Qty: 30 TABLET | Refills: 0 | Status: SHIPPED | OUTPATIENT
Start: 2024-07-01

## 2024-07-01 RX ORDER — LOSARTAN POTASSIUM 100 MG/1
100 TABLET ORAL EVERY MORNING
Qty: 30 TABLET | Refills: 0 | Status: SHIPPED | OUTPATIENT
Start: 2024-07-01

## 2024-08-08 ENCOUNTER — OFFICE VISIT (OUTPATIENT)
Dept: FAMILY MEDICINE CLINIC | Facility: CLINIC | Age: 69
End: 2024-08-08
Payer: MEDICARE

## 2024-08-08 VITALS
DIASTOLIC BLOOD PRESSURE: 62 MMHG | SYSTOLIC BLOOD PRESSURE: 110 MMHG | WEIGHT: 315 LBS | HEIGHT: 70 IN | BODY MASS INDEX: 45.1 KG/M2

## 2024-08-08 DIAGNOSIS — I10 HYPERTENSION, UNSPECIFIED TYPE: Primary | ICD-10-CM

## 2024-08-08 DIAGNOSIS — J30.1 ALLERGIC RHINITIS DUE TO POLLEN, UNSPECIFIED SEASONALITY: ICD-10-CM

## 2024-08-08 DIAGNOSIS — E66.01 MORBID OBESITY: ICD-10-CM

## 2024-08-08 PROCEDURE — 3074F SYST BP LT 130 MM HG: CPT | Performed by: INTERNAL MEDICINE

## 2024-08-08 PROCEDURE — 1159F MED LIST DOCD IN RCRD: CPT | Performed by: INTERNAL MEDICINE

## 2024-08-08 PROCEDURE — 99214 OFFICE O/P EST MOD 30 MIN: CPT | Performed by: INTERNAL MEDICINE

## 2024-08-08 PROCEDURE — 1160F RVW MEDS BY RX/DR IN RCRD: CPT | Performed by: INTERNAL MEDICINE

## 2024-08-08 PROCEDURE — 3078F DIAST BP <80 MM HG: CPT | Performed by: INTERNAL MEDICINE

## 2024-08-08 RX ORDER — LOSARTAN POTASSIUM 50 MG/1
50 TABLET ORAL DAILY
Qty: 30 TABLET | Refills: 2 | Status: SHIPPED | OUTPATIENT
Start: 2024-08-08

## 2024-08-08 RX ORDER — LORATADINE 10 MG/1
10 TABLET ORAL DAILY
Qty: 30 TABLET | Refills: 2 | Status: SHIPPED | OUTPATIENT
Start: 2024-08-08

## 2024-08-08 NOTE — PROGRESS NOTES
08/08/2024    Assessment & Plan     This pleasant 68-year-old presents at this time for follow-up of hypertension.  He also has some questions regarding his allergy medication.  He relates he is taking his blood pressure medication as prescribed he also has questions regarding gabapentin which was prescribed by the podiatrist.  Patient feels that the gabapentin makes him feel very drowsy and he would rather not take it.    He relates that the fexofenadine 180 mg tablet is just too large and he has difficulty swallowing it.  Will switch him to Claritin 10 mg which is much smaller in size.  We discussed with him that it may not be as effective as the Allegra but it varies from person to person.  Will be making the willing to make changes if needed.    Patient's blood pressure was initially 110/60 on recheck by me in the left arm sitting position large cuff it was 106/70.    And closer discussion with him and found that he is noncompliant of his CPAP machine and at BMI of 64.7 I feel that he definitely needs this.  He relates that he will reevaluate and consider restarting it.    Diagnoses and all orders for this visit:    1. Hypertension, unspecified type (Primary)    2. Morbid obesity    3. Allergic rhinitis due to pollen, unspecified seasonality    Other orders  -     loratadine (Claritin) 10 MG tablet; Take 1 tablet by mouth Daily.  Dispense: 30 tablet; Refill: 2  -     losartan (Cozaar) 50 MG tablet; Take 1 tablet by mouth Daily.  Dispense: 30 tablet; Refill: 2         Plan:  1.)  Claritin 10 mg 1 tab p.o. daily  2.)  Decrease his losartan to 50 mg 1 tab p.o. every morning.  3.)  Follow-up in the next several weeks to reevaluate his hypertension control.         CC: Hypertension (F/U.  Question about a smaller allergy pill.)  .        HPI  History of Present Illness     Subjective   Joseph Mcdonald is a 68 y.o. male.      The following portions of the patient's history were reviewed and updated as appropriate:  allergies, current medications, past family history, past medical history, past social history, past surgical history, and problem list.    Problem List  Patient Active Problem List   Diagnosis    Diabetes mellitus type 2, uncontrolled, with complications    HTN (hypertension)    Hyperlipidemia    LAP-BAND surgery status    Morbid obesity    Morbid obesity with BMI of 50.0-59.9, adult    Health care maintenance    Anasarca    Bilateral lower extremity edema    HERMINIA (obstructive sleep apnea)    Pulmonary hypertension, moderate to severe    Diabetes mellitus type II, uncontrolled    Acute hypoxemic respiratory failure    Acute on chronic diastolic (congestive) heart failure    Community acquired pneumonia    COVID-19 virus infection    Hyponatremia       Past Medical History  Past Medical History:   Diagnosis Date    Diabetes mellitus     Hyperlipidemia     Hypertension        Surgical History  Past Surgical History:   Procedure Laterality Date    CARDIAC CATHETERIZATION N/A 2022    Procedure: Right Heart Cath;  Surgeon: Frank Rodríguez MD;  Location: [facility] CATH INVASIVE LOC[facility];  Service: Cardiology;  Laterality: N/A;    LAPAROSCOPIC GASTRIC BANDING N/A            Family History  History reviewed. No pertinent family history.    Social History  Social History    Tobacco Use      Smoking status: Former        Packs/day: 0.00        Years: 0.5 packs/day for 10.0 years (5.0 ttl pk-yrs)        Types: Cigarettes        Start date:         Quit date:         Years since quittin.6      Smokeless tobacco: Never       Is the Patient a current tobacco user? No    Allergies  Allergies   Allergen Reactions    Acetaminophen Anaphylaxis    Codeine Diarrhea and Nausea And Vomiting    Penicillins Other (See Comments)    Sulfa Antibiotics Other (See Comments)       Current Medications    Current Outpatient Medications:     albuterol (ACCUNEB) 1.25 MG/3ML nebulizer solution, Take 3 mL by nebulization Every 6  (Six) Hours As Needed for Shortness of Air., Disp: 50 each, Rfl: 12    aspirin 81 MG EC tablet, Take 1 tablet by mouth As Needed., Disp: , Rfl:     atorvastatin (LIPITOR) 20 MG tablet, TAKE ONE TABLET BY MOUTH DAILY, Disp: 90 tablet, Rfl: 1    Continuous Blood Gluc Sensor (FreeStyle Missy 2 Sensor) misc, , Disp: , Rfl:     doxazosin (CARDURA) 4 MG tablet, TAKE ONE TABLET BY MOUTH ONCE NIGHTLY, Disp: 90 tablet, Rfl: 2    empagliflozin (JARDIANCE) 25 MG tablet tablet, Take 1 tablet by mouth Daily., Disp: , Rfl:     fexofenadine (ALLEGRA) 180 MG tablet, TAKE ONE TABLET BY MOUTH DAILY, Disp: 30 tablet, Rfl: 3    furosemide (LASIX) 40 MG tablet, Take 1 tablet by mouth 2 (Two) Times a Day., Disp: 180 tablet, Rfl: 2    Januvia 100 MG tablet, TAKE 1 TABLET BY MOUTH DAILY, Disp: 30 tablet, Rfl: 0    losartan (COZAAR) 100 MG tablet, TAKE 1 TABLET BY MOUTH EVERY MORNING, Disp: 30 tablet, Rfl: 0    metFORMIN (GLUCOPHAGE) 500 MG tablet, Take 2 tablets by mouth 2 (Two) Times a Day With Meals., Disp: 360 tablet, Rfl: 2    pantoprazole (PROTONIX) 40 MG EC tablet, TAKE 1 TABLET BY MOUTH DAILY, Disp: 30 tablet, Rfl: 4    pioglitazone (ACTOS) 45 MG tablet, Take 1 tablet by mouth Daily., Disp: , Rfl:     triamcinolone (KENALOG) 0.1 % ointment, , Disp: , Rfl:     glipizide (GLUCOTROL) 5 MG tablet, Take 2 tablets by mouth 2 (Two) Times a Day Before Meals., Disp: , Rfl:     loratadine (Claritin) 10 MG tablet, Take 1 tablet by mouth Daily., Disp: 30 tablet, Rfl: 2    losartan (Cozaar) 50 MG tablet, Take 1 tablet by mouth Daily., Disp: 30 tablet, Rfl: 2     Review of System  Review of Systems  I have reviewed and confirmed the accuracy of the ROS as documented by the MA/LPN/RN Dexter Paredes MD    Vitals:    08/08/24 1544   BP: 110/62     Body mass index is 64.68 kg/m².    Objective     Physical Exam  Physical Exam        Dexter Paredes MD  08/08/2024

## 2024-08-20 ENCOUNTER — TELEPHONE (OUTPATIENT)
Dept: FAMILY MEDICINE CLINIC | Facility: CLINIC | Age: 69
End: 2024-08-20

## 2024-08-22 RX ORDER — LORATADINE 10 MG/1
10 TABLET ORAL DAILY
Qty: 30 TABLET | Refills: 2 | Status: SHIPPED | OUTPATIENT
Start: 2024-08-22

## 2024-09-19 ENCOUNTER — OFFICE VISIT (OUTPATIENT)
Dept: FAMILY MEDICINE CLINIC | Facility: CLINIC | Age: 69
End: 2024-09-19
Payer: MEDICARE

## 2024-09-19 ENCOUNTER — TELEPHONE (OUTPATIENT)
Dept: FAMILY MEDICINE CLINIC | Facility: CLINIC | Age: 69
End: 2024-09-19

## 2024-09-19 VITALS
SYSTOLIC BLOOD PRESSURE: 112 MMHG | DIASTOLIC BLOOD PRESSURE: 70 MMHG | BODY MASS INDEX: 45.1 KG/M2 | HEIGHT: 70 IN | WEIGHT: 315 LBS

## 2024-09-19 DIAGNOSIS — R27.0 ATAXIA: ICD-10-CM

## 2024-09-19 DIAGNOSIS — I10 PRIMARY HYPERTENSION: ICD-10-CM

## 2024-09-19 DIAGNOSIS — Z23 NEED FOR INFLUENZA VACCINATION: Primary | ICD-10-CM

## 2024-09-19 PROCEDURE — 3078F DIAST BP <80 MM HG: CPT | Performed by: INTERNAL MEDICINE

## 2024-09-19 PROCEDURE — 90662 IIV NO PRSV INCREASED AG IM: CPT | Performed by: INTERNAL MEDICINE

## 2024-09-19 PROCEDURE — 3074F SYST BP LT 130 MM HG: CPT | Performed by: INTERNAL MEDICINE

## 2024-09-19 PROCEDURE — G0008 ADMIN INFLUENZA VIRUS VAC: HCPCS | Performed by: INTERNAL MEDICINE

## 2024-09-19 PROCEDURE — 99213 OFFICE O/P EST LOW 20 MIN: CPT | Performed by: INTERNAL MEDICINE

## 2024-09-19 NOTE — TELEPHONE ENCOUNTER
Caller: DIEGO LEVIN    Relationship to patient: Brother/Sister    Best call back number: [phone]    Patient is needing: PATIENTS SISTER WANTED TO LET PCP KNOW THAT HE HAS AN INFECTION ON HIS RIGHT LEG THAT NEEDS TO BE LOOKED AT.

## 2024-09-27 ENCOUNTER — OFFICE VISIT (OUTPATIENT)
Dept: FAMILY MEDICINE CLINIC | Facility: CLINIC | Age: 69
End: 2024-09-27
Payer: MEDICARE

## 2024-09-27 VITALS
DIASTOLIC BLOOD PRESSURE: 78 MMHG | SYSTOLIC BLOOD PRESSURE: 132 MMHG | BODY MASS INDEX: 45.1 KG/M2 | HEIGHT: 70 IN | RESPIRATION RATE: 16 BRPM | OXYGEN SATURATION: 92 % | WEIGHT: 315 LBS | HEART RATE: 82 BPM

## 2024-09-27 DIAGNOSIS — L30.9 CHRONIC DERMATITIS: Primary | ICD-10-CM

## 2024-09-27 DIAGNOSIS — E66.01 MORBID OBESITY: ICD-10-CM

## 2024-09-27 DIAGNOSIS — R20.2 PARESTHESIA OF FOOT: ICD-10-CM

## 2024-09-27 PROCEDURE — 99214 OFFICE O/P EST MOD 30 MIN: CPT | Performed by: INTERNAL MEDICINE

## 2024-09-27 PROCEDURE — 1160F RVW MEDS BY RX/DR IN RCRD: CPT | Performed by: INTERNAL MEDICINE

## 2024-09-27 PROCEDURE — 1159F MED LIST DOCD IN RCRD: CPT | Performed by: INTERNAL MEDICINE

## 2024-09-27 PROCEDURE — 3078F DIAST BP <80 MM HG: CPT | Performed by: INTERNAL MEDICINE

## 2024-09-27 PROCEDURE — 3075F SYST BP GE 130 - 139MM HG: CPT | Performed by: INTERNAL MEDICINE

## 2024-10-02 ENCOUNTER — TELEPHONE (OUTPATIENT)
Dept: FAMILY MEDICINE CLINIC | Facility: CLINIC | Age: 69
End: 2024-10-02
Payer: MEDICARE

## 2024-10-02 NOTE — PROGRESS NOTES
09/19/2024    Assessment & Plan   This 68-year-old presents at this time for evaluation of a new walker.  He relates that he has relied upon mobility with a seated walker but has found this an adequate because of his frame of 453 pounds with a BMI of 65.    The patient demonstrates in the office is difficulty with ambulation without a seated walker secondary he is overall medical condition and increased BMI.  Because of the weakness in his legs he is unable to mobilize without assistance and he needs a heavy-duty seated walker because of his size and impairment.  He demonstrates the ability to safely use a seated walker but a cane would not provide the stability or safety needed.    Diagnoses and all orders for this visit:    1. Need for influenza vaccination (Primary)  -     Fluzone High-Dose 65+yrs    2. Primary hypertension    3. Ataxia    Plan:  1.)  Will try to obtain heavy-duty seated walker for locomotion              CC: Hypertension (F/U---no other issues)  .        HPI  History of Present Illness     Subjective   Joseph Mcdonald is a 68 y.o. male.      The following portions of the patient's history were reviewed and updated as appropriate: allergies, current medications, past family history, past medical history, past social history, past surgical history, and problem list.    Problem List  Patient Active Problem List   Diagnosis    Diabetes mellitus type 2, uncontrolled, with complications    HTN (hypertension)    Hyperlipidemia    LAP-BAND surgery status    Morbid obesity    Morbid obesity with BMI of 50.0-59.9, adult    Health care maintenance    Anasarca    Bilateral lower extremity edema    HERMINIA (obstructive sleep apnea)    Pulmonary hypertension, moderate to severe    Diabetes mellitus type II, uncontrolled    Acute hypoxemic respiratory failure    Acute on chronic diastolic (congestive) heart failure    Community acquired pneumonia    COVID-19 virus infection    Hyponatremia       Past Medical  History  Past Medical History:   Diagnosis Date    Diabetes mellitus     Hyperlipidemia     Hypertension        Surgical History  Past Surgical History:   Procedure Laterality Date    CARDIAC CATHETERIZATION N/A 2022    Procedure: Right Heart Cath;  Surgeon: Frank Rodríguez MD;  Location: [facility] CATH INVASIVE LOCATION;  Service: Cardiology;  Laterality: N/A;    LAPAROSCOPIC GASTRIC BANDING N/A            Family History  History reviewed. No pertinent family history.    Social History  Social History    Tobacco Use      Smoking status: Former        Packs/day: 0.00        Years: 0.5 packs/day for 10.0 years (5.0 ttl pk-yrs)        Types: Cigarettes        Start date:         Quit date:         Years since quittin.7      Smokeless tobacco: Never       Is the Patient a current tobacco user? No    Allergies  Allergies   Allergen Reactions    Acetaminophen Anaphylaxis    Codeine Diarrhea and Nausea And Vomiting    Penicillins Other (See Comments)    Sulfa Antibiotics Other (See Comments)       Current Medications    Current Outpatient Medications:     albuterol (ACCUNEB) 1.25 MG/3ML nebulizer solution, Take 3 mL by nebulization Every 6 (Six) Hours As Needed for Shortness of Air., Disp: 50 each, Rfl: 12    aspirin 81 MG EC tablet, Take 1 tablet by mouth As Needed., Disp: , Rfl:     atorvastatin (LIPITOR) 20 MG tablet, TAKE ONE TABLET BY MOUTH DAILY, Disp: 90 tablet, Rfl: 1    Continuous Blood Gluc Sensor (FreeStyle Missy 2 Sensor) misc, , Disp: , Rfl:     doxazosin (CARDURA) 4 MG tablet, TAKE ONE TABLET BY MOUTH ONCE NIGHTLY, Disp: 90 tablet, Rfl: 2    empagliflozin (JARDIANCE) 25 MG tablet tablet, Take 1 tablet by mouth Daily., Disp: , Rfl:     fexofenadine (ALLEGRA) 180 MG tablet, TAKE ONE TABLET BY MOUTH DAILY, Disp: 30 tablet, Rfl: 3    furosemide (LASIX) 40 MG tablet, Take 1 tablet by mouth 2 (Two) Times a Day., Disp: 180 tablet, Rfl: 2    loratadine (Claritin) 10 MG tablet, Take 1 tablet by  mouth Daily., Disp: 30 tablet, Rfl: 2    losartan (Cozaar) 50 MG tablet, Take 1 tablet by mouth Daily. (Patient not taking: Reported on 9/27/2024), Disp: 30 tablet, Rfl: 2    metFORMIN (GLUCOPHAGE) 500 MG tablet, Take 2 tablets by mouth 2 (Two) Times a Day With Meals., Disp: 360 tablet, Rfl: 2    pantoprazole (PROTONIX) 40 MG EC tablet, TAKE 1 TABLET BY MOUTH DAILY, Disp: 30 tablet, Rfl: 4    pioglitazone (ACTOS) 45 MG tablet, Take 1 tablet by mouth Daily., Disp: , Rfl:     SITagliptin (Januvia) 100 MG tablet, Take 1 tablet by mouth Daily., Disp: 30 tablet, Rfl: 5    triamcinolone (KENALOG) 0.1 % ointment, , Disp: , Rfl:     glipizide (GLUCOTROL) 5 MG tablet, Take 2 tablets by mouth 2 (Two) Times a Day Before Meals., Disp: , Rfl:      Review of System  Review of Systems  I have reviewed and confirmed the accuracy of the ROS as documented by the MA/LPN/RN Dexter Paredes MD    Vitals:    09/19/24 1035   BP: 112/70     Body mass index is 65 kg/m².    Objective     Physical Exam  Physical Exam        Dexter Paredes MD  09/19/2024

## 2024-10-25 ENCOUNTER — OFFICE VISIT (OUTPATIENT)
Dept: FAMILY MEDICINE CLINIC | Facility: CLINIC | Age: 69
End: 2024-10-25
Payer: MEDICARE

## 2024-10-25 VITALS
BODY MASS INDEX: 45.1 KG/M2 | DIASTOLIC BLOOD PRESSURE: 78 MMHG | SYSTOLIC BLOOD PRESSURE: 122 MMHG | HEIGHT: 70 IN | HEART RATE: 77 BPM | OXYGEN SATURATION: 95 % | WEIGHT: 315 LBS

## 2024-10-25 DIAGNOSIS — Z98.84 LAP-BAND SURGERY STATUS: ICD-10-CM

## 2024-10-25 DIAGNOSIS — M79.673 PAIN OF PLANTAR ASPECT OF HEEL: ICD-10-CM

## 2024-10-25 DIAGNOSIS — R20.2 PARESTHESIA OF FOOT: ICD-10-CM

## 2024-10-25 DIAGNOSIS — Z91.199 MEDICALLY NONCOMPLIANT: ICD-10-CM

## 2024-10-25 DIAGNOSIS — I10 PRIMARY HYPERTENSION: ICD-10-CM

## 2024-10-25 DIAGNOSIS — L30.9 CHRONIC DERMATITIS: Primary | ICD-10-CM

## 2024-10-25 DIAGNOSIS — G47.33 OBSTRUCTIVE SLEEP APNEA: ICD-10-CM

## 2024-10-25 DIAGNOSIS — E66.01 MORBID OBESITY: ICD-10-CM

## 2024-10-25 PROCEDURE — 1160F RVW MEDS BY RX/DR IN RCRD: CPT | Performed by: INTERNAL MEDICINE

## 2024-10-25 PROCEDURE — 1159F MED LIST DOCD IN RCRD: CPT | Performed by: INTERNAL MEDICINE

## 2024-10-25 PROCEDURE — 99214 OFFICE O/P EST MOD 30 MIN: CPT | Performed by: INTERNAL MEDICINE

## 2024-10-25 PROCEDURE — 3074F SYST BP LT 130 MM HG: CPT | Performed by: INTERNAL MEDICINE

## 2024-10-25 PROCEDURE — 3078F DIAST BP <80 MM HG: CPT | Performed by: INTERNAL MEDICINE

## 2024-10-25 RX ORDER — GLIMEPIRIDE 2 MG/1
2 TABLET ORAL
COMMUNITY
Start: 2024-10-07

## 2024-10-25 RX ORDER — FUROSEMIDE 40 MG
40 TABLET ORAL 2 TIMES DAILY
Qty: 180 TABLET | Refills: 2 | Status: SHIPPED | OUTPATIENT
Start: 2024-10-25

## 2024-10-25 RX ORDER — DIPHENHYDRAMINE HCL 25 MG
25 CAPSULE ORAL EVERY 6 HOURS PRN
COMMUNITY

## 2024-10-25 NOTE — PROGRESS NOTES
10/25/2024    Assessment & Plan       This 69-year-old patient presents today with complaints of continued pain like sticking sensation in both lower extremities from the knees down.  This has been a problem for the past several years.  He was last seen by this 2 years ago during his hospitalization and prescribed gabapentin for peripheral neuropathy.  Patient relates however that he did not like the side effects he read about so he has not taken any of the medication.  But he relates that the pain fullness has increased in severity and he is looking for other options and medications.  He also has chronic dermatitis of both lower extremities from the knees down his feet which she relates is essentially unchanged over the past year.  There is no evidence of infection of the lower extremities but moderate chronic dermatitis.  Finally he relates that the bottom of his feet are exquisitely painful and is difficult him to walk specially in his bare feet.  Physical examination shows scattered areas of tenderness along the plantar aspect of both feet.    Patient's weight is up 10 pounds from his last visit approximately a month ago up to 461 pounds with a BMI of 66.1.        Patient has a history of obstructive sleep apnea but relates that he does not use his CPAP machine consistently.  We have discussed with him the importance of doing this especially with his past history of pulmonary hypertension.    Diagnoses and all orders for this visit:    1. Chronic dermatitis (Primary)    2. Morbid obesity    3. Paresthesia of foot  -     Ambulatory Referral to Neurology    4. Primary hypertension    5. LAP-BAND surgery status         Plan:  1.)  Referral to neurology for evaluation of bilateral peripheral neuropathy of the lower extremity  2.)  Referral to podiatry for evaluation of plantar pain  3.)  We urged the patient to be more compliant of his CPAP machine  4.)  Follow-up in 1 to 2 months         CC: Hypertension and  Chronic dermatitis  .        HPI  History of Present Illness     Subjective   Joseph Mcdonald is a 69 y.o. male.      The following portions of the patient's history were reviewed and updated as appropriate: allergies, current medications, past family history, past medical history, past social history, past surgical history, and problem list.    Problem List  Patient Active Problem List   Diagnosis   • Diabetes mellitus type 2, uncontrolled, with complications   • HTN (hypertension)   • Hyperlipidemia   • LAP-BAND surgery status   • Morbid obesity   • Morbid obesity with BMI of 50.0-59.9, adult   • Health care maintenance   • Anasarca   • Bilateral lower extremity edema   • HERMINIA (obstructive sleep apnea)   • Pulmonary hypertension, moderate to severe   • Diabetes mellitus type II, uncontrolled   • Acute hypoxemic respiratory failure   • Acute on chronic diastolic (congestive) heart failure   • Community acquired pneumonia   • COVID-19 virus infection   • Hyponatremia       Past Medical History  Past Medical History:   Diagnosis Date   • Diabetes mellitus    • Hyperlipidemia    • Hypertension        Surgical History  Past Surgical History:   Procedure Laterality Date   • CARDIAC CATHETERIZATION N/A 2022    Procedure: Right Heart Cath;  Surgeon: Frank Rodríguez MD;  Location: [facility] INVASIVE LOCATION;  Service: Cardiology;  Laterality: N/A;   • LAPAROSCOPIC GASTRIC BANDING N/A            Family History  History reviewed. No pertinent family history.    Social History  Social History    Tobacco Use      Smoking status: Former        Packs/day: 0.00        Years: 0.5 packs/day for 10.0 years (5.0 ttl pk-yrs)        Types: Cigarettes        Start date:         Quit date: 1970        Years since quittin.8      Smokeless tobacco: Never       Is the Patient a current tobacco user? No    Allergies  Allergies   Allergen Reactions   • Acetaminophen Anaphylaxis   • Codeine Diarrhea and Nausea And Vomiting    • Penicillins Other (See Comments)   • Sulfa Antibiotics Other (See Comments)       Current Medications    Current Outpatient Medications:   •  albuterol (ACCUNEB) 1.25 MG/3ML nebulizer solution, Take 3 mL by nebulization Every 6 (Six) Hours As Needed for Shortness of Air., Disp: 50 each, Rfl: 12  •  aspirin 81 MG EC tablet, Take 1 tablet by mouth As Needed., Disp: , Rfl:   •  atorvastatin (LIPITOR) 20 MG tablet, TAKE ONE TABLET BY MOUTH DAILY, Disp: 90 tablet, Rfl: 1  •  diphenhydrAMINE (BENADRYL) 25 mg capsule, Take 1 capsule by mouth Every 6 (Six) Hours As Needed for Itching., Disp: , Rfl:   •  doxazosin (CARDURA) 4 MG tablet, TAKE ONE TABLET BY MOUTH ONCE NIGHTLY, Disp: 90 tablet, Rfl: 2  •  fexofenadine (ALLEGRA) 180 MG tablet, TAKE ONE TABLET BY MOUTH DAILY, Disp: 30 tablet, Rfl: 3  •  furosemide (LASIX) 40 MG tablet, Take 1 tablet by mouth 2 (Two) Times a Day., Disp: 180 tablet, Rfl: 2  •  glimepiride (AMARYL) 2 MG tablet, Take 1 tablet by mouth Every Morning Before Breakfast., Disp: , Rfl:   •  metFORMIN (GLUCOPHAGE) 500 MG tablet, Take 2 tablets by mouth 2 (Two) Times a Day With Meals., Disp: 360 tablet, Rfl: 2  •  pantoprazole (PROTONIX) 40 MG EC tablet, TAKE 1 TABLET BY MOUTH DAILY, Disp: 30 tablet, Rfl: 4  •  pioglitazone (ACTOS) 45 MG tablet, Take 1 tablet by mouth Daily., Disp: , Rfl:   •  triamcinolone (KENALOG) 0.1 % ointment, , Disp: , Rfl:   •  Continuous Blood Gluc Sensor (FreeStyle Missy 2 Sensor) misc, , Disp: , Rfl:   •  empagliflozin (JARDIANCE) 25 MG tablet tablet, Take 1 tablet by mouth Daily. (Patient not taking: Reported on 10/25/2024), Disp: , Rfl:   •  glipizide (GLUCOTROL) 5 MG tablet, Take 2 tablets by mouth 2 (Two) Times a Day Before Meals., Disp: , Rfl:   •  loratadine (Claritin) 10 MG tablet, Take 1 tablet by mouth Daily. (Patient not taking: Reported on 10/25/2024), Disp: 30 tablet, Rfl: 2  •  losartan (Cozaar) 50 MG tablet, Take 1 tablet by mouth Daily. (Patient not taking:  Reported on 10/25/2024), Disp: 30 tablet, Rfl: 2  •  SITagliptin (Januvia) 100 MG tablet, Take 1 tablet by mouth Daily. (Patient not taking: Reported on 10/25/2024), Disp: 30 tablet, Rfl: 5     Review of System  Review of Systems   Constitutional:  Negative for chills and fever.   Respiratory:  Negative for cough and shortness of breath.    Cardiovascular:  Negative for chest pain and palpitations.   Gastrointestinal:  Negative for constipation, diarrhea, nausea and vomiting.   Neurological:  Negative for dizziness and headache.   I have reviewed and confirmed the accuracy of the ROS as documented by the MA/LPN/RN Dexter Paredes MD    Vitals:    10/25/24 1345   BP: 122/78   Pulse: 77   SpO2: 95%     Body mass index is 66.15 kg/m².    Objective     Physical Exam  Physical Exam  Constitutional:       General: He is not in acute distress.     Appearance: Normal appearance.   HENT:      Head: Normocephalic and atraumatic.   Cardiovascular:      Rate and Rhythm: Normal rate and regular rhythm.   Pulmonary:      Effort: Pulmonary effort is normal. No respiratory distress.      Breath sounds: Normal breath sounds. No wheezing, rhonchi or rales.   Neurological:      General: No focal deficit present.      Mental Status: He is alert and oriented to person, place, and time.   Psychiatric:         Mood and Affect: Mood normal.         Behavior: Behavior normal.         Thought Content: Thought content normal.         Judgment: Judgment normal.         Dexter Paredes MD  10/25/2024

## 2024-11-11 RX ORDER — PANTOPRAZOLE SODIUM 40 MG/1
40 TABLET, DELAYED RELEASE ORAL DAILY
Qty: 30 TABLET | Refills: 4 | Status: SHIPPED | OUTPATIENT
Start: 2024-11-11

## 2024-11-29 ENCOUNTER — TRANSCRIBE ORDERS (OUTPATIENT)
Dept: ADMINISTRATIVE | Facility: HOSPITAL | Age: 69
End: 2024-11-29
Payer: MEDICARE

## 2024-11-29 DIAGNOSIS — I27.20 PULMONARY HTN: Primary | ICD-10-CM

## 2024-12-06 RX ORDER — ATORVASTATIN CALCIUM 20 MG/1
20 TABLET, FILM COATED ORAL DAILY
Qty: 90 TABLET | Refills: 1 | Status: SHIPPED | OUTPATIENT
Start: 2024-12-06

## 2024-12-16 ENCOUNTER — HOSPITAL ENCOUNTER (OUTPATIENT)
Dept: CARDIOLOGY | Facility: HOSPITAL | Age: 69
Discharge: HOME | End: 2024-12-16
Admitting: INTERNAL MEDICINE
Payer: MEDICARE

## 2024-12-16 VITALS
SYSTOLIC BLOOD PRESSURE: 138 MMHG | WEIGHT: 315 LBS | DIASTOLIC BLOOD PRESSURE: 78 MMHG | HEIGHT: 70 IN | BODY MASS INDEX: 45.1 KG/M2

## 2024-12-16 DIAGNOSIS — I27.20 PULMONARY HTN: ICD-10-CM

## 2024-12-16 LAB
AORTIC DIMENSIONLESS INDEX: 0.68 (DI)
ASCENDING AORTA: 3.6 CM
BH CV ECHO MEAS - ACS: 2.6 CM
BH CV ECHO MEAS - AO MAX PG: 7.7 MMHG
BH CV ECHO MEAS - AO MEAN PG: 3.9 MMHG
BH CV ECHO MEAS - AO ROOT DIAM: 4.2 CM
BH CV ECHO MEAS - AO V2 MAX: 138.5 CM/SEC
BH CV ECHO MEAS - AO V2 VTI: 28.1 CM
BH CV ECHO MEAS - AVA(I,D): 2.22 CM2
BH CV ECHO MEAS - EDV(CUBED): 88.2 ML
BH CV ECHO MEAS - EDV(MOD-SP2): 148 ML
BH CV ECHO MEAS - EDV(MOD-SP4): 158 ML
BH CV ECHO MEAS - EF(MOD-BP): 70.3 %
BH CV ECHO MEAS - EF(MOD-SP2): 71.6 %
BH CV ECHO MEAS - EF(MOD-SP4): 71.5 %
BH CV ECHO MEAS - ESV(CUBED): 24.9 ML
BH CV ECHO MEAS - ESV(MOD-SP2): 42 ML
BH CV ECHO MEAS - ESV(MOD-SP4): 45 ML
BH CV ECHO MEAS - FS: 34.4 %
BH CV ECHO MEAS - IVS/LVPW: 1.1 CM
BH CV ECHO MEAS - IVSD: 1.69 CM
BH CV ECHO MEAS - LAT PEAK E' VEL: 7.8 CM/SEC
BH CV ECHO MEAS - LV DIASTOLIC VOL/BSA (35-75): 53.1 CM2
BH CV ECHO MEAS - LV MASS(C)D: 303.3 GRAMS
BH CV ECHO MEAS - LV MAX PG: 4.9 MMHG
BH CV ECHO MEAS - LV MEAN PG: 2.34 MMHG
BH CV ECHO MEAS - LV SYSTOLIC VOL/BSA (12-30): 15.1 CM2
BH CV ECHO MEAS - LV V1 MAX: 111.2 CM/SEC
BH CV ECHO MEAS - LV V1 VTI: 19 CM
BH CV ECHO MEAS - LVIDD: 4.5 CM
BH CV ECHO MEAS - LVIDS: 2.9 CM
BH CV ECHO MEAS - LVOT AREA: 3.3 CM2
BH CV ECHO MEAS - LVOT DIAM: 2.04 CM
BH CV ECHO MEAS - LVPWD: 1.54 CM
BH CV ECHO MEAS - MED PEAK E' VEL: 7.6 CM/SEC
BH CV ECHO MEAS - MV A DUR: 0.13 SEC
BH CV ECHO MEAS - MV A MAX VEL: 148 CM/SEC
BH CV ECHO MEAS - MV DEC SLOPE: 950.5 CM/SEC2
BH CV ECHO MEAS - MV DEC TIME: 0.13 SEC
BH CV ECHO MEAS - MV E MAX VEL: 136.5 CM/SEC
BH CV ECHO MEAS - MV E/A: 0.92
BH CV ECHO MEAS - MV MAX PG: 7.2 MMHG
BH CV ECHO MEAS - MV MEAN PG: 4.2 MMHG
BH CV ECHO MEAS - MV P1/2T: 40.6 MSEC
BH CV ECHO MEAS - MV V2 VTI: 35.2 CM
BH CV ECHO MEAS - MVA(P1/2T): 5.4 CM2
BH CV ECHO MEAS - MVA(VTI): 1.77 CM2
BH CV ECHO MEAS - PA ACC TIME: 0.12 SEC
BH CV ECHO MEAS - PA V2 MAX: 123.5 CM/SEC
BH CV ECHO MEAS - PULM A REVS DUR: 0.12 SEC
BH CV ECHO MEAS - PULM A REVS VEL: 34.3 CM/SEC
BH CV ECHO MEAS - PULM DIAS VEL: 35.8 CM/SEC
BH CV ECHO MEAS - PULM S/D: 0.98
BH CV ECHO MEAS - PULM SYS VEL: 35.1 CM/SEC
BH CV ECHO MEAS - QP/QS: 1.89
BH CV ECHO MEAS - RAP SYSTOLE: 8 MMHG
BH CV ECHO MEAS - RV MAX PG: 3.6 MMHG
BH CV ECHO MEAS - RV V1 MAX: 95.2 CM/SEC
BH CV ECHO MEAS - RV V1 VTI: 15.5 CM
BH CV ECHO MEAS - RVOT DIAM: 3.1 CM
BH CV ECHO MEAS - RVSP: 32 MMHG
BH CV ECHO MEAS - SV(LVOT): 62.4 ML
BH CV ECHO MEAS - SV(MOD-SP2): 106 ML
BH CV ECHO MEAS - SV(MOD-SP4): 113 ML
BH CV ECHO MEAS - SV(RVOT): 118.2 ML
BH CV ECHO MEAS - SVI(LVOT): 21 ML/M2
BH CV ECHO MEAS - SVI(MOD-SP2): 35.6 ML/M2
BH CV ECHO MEAS - SVI(MOD-SP4): 38 ML/M2
BH CV ECHO MEAS - TAPSE (>1.6): 2.9 CM
BH CV ECHO MEAS - TR MAX PG: 23.9 MMHG
BH CV ECHO MEAS - TR MAX VEL: 244.3 CM/SEC
BH CV ECHO MEASUREMENTS AVERAGE E/E' RATIO: 17.73
BH CV XLRA - RV BASE: 4.2 CM
BH CV XLRA - RV LENGTH: 9 CM
BH CV XLRA - RV MID: 3.4 CM
BH CV XLRA - TDI S': 18 CM/SEC
SINUS: 3.9 CM
STJ: 2.6 CM

## 2024-12-16 PROCEDURE — 25510000001 PERFLUTREN 6.52 MG/ML SUSPENSION 2 ML VIAL: Performed by: INTERNAL MEDICINE

## 2024-12-16 PROCEDURE — 93306 TTE W/DOPPLER COMPLETE: CPT

## 2024-12-16 RX ADMIN — PERFLUTREN 2 ML: 6.52 INJECTION, SUSPENSION INTRAVENOUS at 14:49

## 2024-12-18 ENCOUNTER — TRANSCRIBE ORDERS (OUTPATIENT)
Age: 69
End: 2024-12-18
Payer: MEDICARE

## 2024-12-18 DIAGNOSIS — I50.30 DIASTOLIC HEART FAILURE OF UNKNOWN ETIOLOGY: Primary | ICD-10-CM

## 2025-01-02 ENCOUNTER — TELEPHONE (OUTPATIENT)
Dept: FAMILY MEDICINE CLINIC | Facility: CLINIC | Age: 70
End: 2025-01-02

## 2025-01-02 NOTE — TELEPHONE ENCOUNTER
Caller: Joseph Mcdonald    Relationship: Self    Best call back number: [phone]     What medication are you requesting: ZPAC OR SIMILAR    What are your current symptoms: COUGH WITH PHLEM, SNEEZING, CONGESTED    How long have you been experiencing symptoms: 3 DAYS      If a prescription is needed, what is your preferred pharmacy and phone number: [id number] - [place], [place] - [address]  - [phone]  - [phone] FX

## 2025-01-03 RX ORDER — DEXTROMETHORPHAN POLISTIREX 30 MG/5ML
60 SUSPENSION ORAL EVERY 12 HOURS SCHEDULED
Qty: 280 ML | Refills: 1 | Status: SHIPPED | OUTPATIENT
Start: 2025-01-03

## 2025-01-17 ENCOUNTER — OFFICE VISIT (OUTPATIENT)
Dept: FAMILY MEDICINE CLINIC | Facility: CLINIC | Age: 70
End: 2025-01-17
Payer: MEDICARE

## 2025-01-17 VITALS — WEIGHT: 315 LBS | BODY MASS INDEX: 45.1 KG/M2 | HEIGHT: 70 IN

## 2025-01-17 DIAGNOSIS — G47.33 OBSTRUCTIVE SLEEP APNEA: ICD-10-CM

## 2025-01-17 DIAGNOSIS — E66.01 MORBID OBESITY: Primary | ICD-10-CM

## 2025-01-17 DIAGNOSIS — Z98.84 LAP-BAND SURGERY STATUS: ICD-10-CM

## 2025-01-17 DIAGNOSIS — Z91.199 MEDICALLY NONCOMPLIANT: ICD-10-CM

## 2025-01-17 DIAGNOSIS — I10 PRIMARY HYPERTENSION: ICD-10-CM

## 2025-01-17 PROCEDURE — 1160F RVW MEDS BY RX/DR IN RCRD: CPT | Performed by: INTERNAL MEDICINE

## 2025-01-17 PROCEDURE — 99214 OFFICE O/P EST MOD 30 MIN: CPT | Performed by: INTERNAL MEDICINE

## 2025-01-17 PROCEDURE — 1159F MED LIST DOCD IN RCRD: CPT | Performed by: INTERNAL MEDICINE

## 2025-01-20 ENCOUNTER — TELEPHONE (OUTPATIENT)
Dept: CARDIOLOGY | Facility: CLINIC | Age: 70
End: 2025-01-20

## 2025-01-20 NOTE — TELEPHONE ENCOUNTER
HUB UNABLE TO WARM TRANSFER     Caller: Joseph Mcdonald    Relationship to patient: Self    Best call back number: [phone]    Patient is needing: PATIENT RETURNING CALL TO SCHEDULE FROM REFERRAL.

## 2025-01-21 NOTE — TELEPHONE ENCOUNTER
1/21/25 Left another message for patient to call back to schedule. Patient is established with Dr. Handy and is overdue for a follow up.

## 2025-01-29 NOTE — PROGRESS NOTES
01/17/2025    Summarization of Visit       Assessment & Plan   There are no diagnoses linked to this encounter.  Assessment & Plan  1. Hypertension.  His blood pressure readings have been consistently within the normal range, even without the aid of antihypertensive medication. Today's reading was 120/70. Given the stability of his blood pressure, the frequency of his follow-up visits can be reduced.    2. Back pain.  He reports persistent back pain, especially when bending over to pick something up. This pain has been ongoing since his hospitalization. He plans to join a gym next week to improve his fitness and potentially alleviate some of the pain.  We note his BMI of 65.6    3. Respiratory issues.  His oxygen saturation levels are slightly below the desired range, currently at 94%. He uses supplemental oxygen mostly at night and expresses a desire to wean off it. A comprehensive evaluation of his pulmonary function will be conducted to assess the need for continued oxygen therapy.  He has a history of obstructive sleep apnea but does not use his CPAP machine    4. Medicare wellness visit.  He needs to schedule a Medicare wellness visit in the next few weeks, as it was due on January 2 and has not been completed.    Results      Class 3 Severe Obesity (BMI >=40). Obesity-related health conditions include the following: obstructive sleep apnea and hypertension. Obesity is improving with treatment. BMI is is above average; BMI management plan is completed. We discussed portion control and increasing exercise.           CC: Hypertension (F/U---no other issues.  Unable to get bp)  .        HPI  Hypertension  Pertinent negatives include no chest pain, palpitations or shortness of breath.      History of Present Illness  The patient is a 69-year-old male who presents for a follow-up on his blood pressure, back pain, and breathing issues.    He reports a decrease in his consumption of chili, which he believes may have  contributed to his recent weight loss. He has been compliant with his medication regimen today. His blood pressure medication was discontinued due to its efficacy in maintaining his blood pressure within the range of 110 to 112. He does not report any significant improvement in his overall health status.    He experiences back pain, particularly when bending over to lift objects, a symptom that has been persistent since his hospital discharge. He attributes this discomfort to strenuous lifting activities during his youth and his tenure in road maintenance work. He plans to join a fitness center next week to address these issues.    His respiratory function remains stable, with no reported difficulties. He continues to utilize oxygen therapy at night but expresses a desire to gradually discontinue its use. He possesses both small and large oxygen tanks, the latter of which he has had for 20 years.    MEDICATIONS  Discontinued: Alissa Euceda   Joseph Mcdonald is a 69 y.o. male.      The following portions of the patient's history were reviewed and updated as appropriate: allergies, current medications, past family history, past medical history, past social history, past surgical history, and problem list.    Problem List  Patient Active Problem List   Diagnosis    Diabetes mellitus type 2, uncontrolled, with complications    HTN (hypertension)    Hyperlipidemia    LAP-BAND surgery status    Morbid obesity    Morbid obesity with BMI of 50.0-59.9, adult    Health care maintenance    Anasarca    Bilateral lower extremity edema    HERMINIA (obstructive sleep apnea)    Pulmonary hypertension, moderate to severe    Diabetes mellitus type II, uncontrolled    Acute hypoxemic respiratory failure    Acute on chronic diastolic (congestive) heart failure    Community acquired pneumonia    COVID-19 virus infection    Hyponatremia       Past Medical History  Past Medical History:   Diagnosis Date    Diabetes mellitus      Hyperlipidemia     Hypertension        Surgical History  Past Surgical History:   Procedure Laterality Date    CARDIAC CATHETERIZATION N/A 2022    Procedure: Right Heart Cath;  Surgeon: Frank Rodríguez MD;  Location: [facility] CATH INVASIVE LOCATION;  Service: Cardiology;  Laterality: N/A;    LAPAROSCOPIC GASTRIC BANDING N/A            Family History  History reviewed. No pertinent family history.    Social History  Social History    Tobacco Use      Smoking status: Former        Packs/day: 0.00        Years: 0.5 packs/day for 10.0 years (5.0 ttl pk-yrs)        Types: Cigarettes        Start date:         Quit date: 1970        Years since quittin.1      Smokeless tobacco: Never       Is the Patient a current tobacco user? No    Allergies  Allergies   Allergen Reactions    Acetaminophen Anaphylaxis    Codeine Diarrhea and Nausea And Vomiting    Penicillins Other (See Comments)    Sulfa Antibiotics Other (See Comments)       Current Medications    Current Outpatient Medications:     albuterol (ACCUNEB) 1.25 MG/3ML nebulizer solution, Take 3 mL by nebulization Every 6 (Six) Hours As Needed for Shortness of Air., Disp: 50 each, Rfl: 12    aspirin 81 MG EC tablet, Take 1 tablet by mouth As Needed., Disp: , Rfl:     atorvastatin (LIPITOR) 20 MG tablet, TAKE 1 TABLET BY MOUTH DAILY, Disp: 90 tablet, Rfl: 1    dextromethorphan polistirex ER (Delsym) 30 MG/5ML Suspension Extended Release oral suspension, Take 10 mL by mouth Every 12 (Twelve) Hours., Disp: 280 mL, Rfl: 1    diphenhydrAMINE (BENADRYL) 25 mg capsule, Take 1 capsule by mouth Every 6 (Six) Hours As Needed for Itching., Disp: , Rfl:     doxazosin (CARDURA) 4 MG tablet, TAKE ONE TABLET BY MOUTH ONCE NIGHTLY, Disp: 90 tablet, Rfl: 2    fexofenadine (ALLEGRA) 180 MG tablet, TAKE ONE TABLET BY MOUTH DAILY, Disp: 30 tablet, Rfl: 3    furosemide (LASIX) 40 MG tablet, TAKE 1 TABLET BY MOUTH TWICE A DAY, Disp: 180 tablet, Rfl: 2    glimepiride (AMARYL) 2  MG tablet, Take 1 tablet by mouth 2 (Two) Times a Day., Disp: , Rfl:     metFORMIN (GLUCOPHAGE) 500 MG tablet, Take 2 tablets by mouth 2 (Two) Times a Day With Meals., Disp: 360 tablet, Rfl: 2    pantoprazole (PROTONIX) 40 MG EC tablet, TAKE 1 TABLET BY MOUTH DAILY, Disp: 30 tablet, Rfl: 4    pioglitazone (ACTOS) 45 MG tablet, Take 1 tablet by mouth Daily., Disp: , Rfl:     triamcinolone (KENALOG) 0.1 % ointment, , Disp: , Rfl:     glipizide (GLUCOTROL) 5 MG tablet, Take 2 tablets by mouth 2 (Two) Times a Day Before Meals., Disp: , Rfl:      Review of System  Review of Systems   Constitutional:  Negative for chills and fever.   Respiratory:  Negative for cough and shortness of breath.    Cardiovascular:  Negative for chest pain and palpitations.   Gastrointestinal:  Negative for constipation, diarrhea, nausea and vomiting.   Neurological:  Negative for dizziness and headache.     I have reviewed and confirmed the accuracy of the ROS as documented by the MA/LPN/RN Dexter Paredes MD    There were no vitals filed for this visit.  Body mass index is 65.57 kg/m².    Objective     Physical Exam  Physical Exam  Constitutional:       General: He is not in acute distress.     Appearance: Normal appearance.   HENT:      Head: Normocephalic and atraumatic.   Cardiovascular:      Rate and Rhythm: Normal rate and regular rhythm.   Pulmonary:      Effort: Pulmonary effort is normal. No respiratory distress.      Breath sounds: Normal breath sounds. No wheezing, rhonchi or rales.   Neurological:      General: No focal deficit present.      Mental Status: He is alert and oriented to person, place, and time.   Psychiatric:         Mood and Affect: Mood normal.         Behavior: Behavior normal.         Thought Content: Thought content normal.         Judgment: Judgment normal.           Patient or patient representative verbalized consent for the use of Ambient Listening during the visit with  Dexter Paredes MD for chart  documentation. 1/28/2025  22:31 EST    Dexter Paredes MD  01/17/2025

## 2025-03-17 ENCOUNTER — OFFICE VISIT (OUTPATIENT)
Dept: FAMILY MEDICINE CLINIC | Facility: CLINIC | Age: 70
End: 2025-03-17
Payer: MEDICARE

## 2025-03-17 VITALS — BODY MASS INDEX: 45.1 KG/M2 | HEIGHT: 70 IN | OXYGEN SATURATION: 92 % | HEART RATE: 80 BPM | WEIGHT: 315 LBS

## 2025-03-17 DIAGNOSIS — M79.671 FOOT PAIN, BILATERAL: ICD-10-CM

## 2025-03-17 DIAGNOSIS — Z83.511 FAMILY HISTORY OF GLAUCOMA: ICD-10-CM

## 2025-03-17 DIAGNOSIS — R35.1 NOCTURIA: ICD-10-CM

## 2025-03-17 DIAGNOSIS — I50.33 ACUTE ON CHRONIC DIASTOLIC (CONGESTIVE) HEART FAILURE: Primary | ICD-10-CM

## 2025-03-17 DIAGNOSIS — E03.9 HYPOTHYROIDISM, UNSPECIFIED TYPE: ICD-10-CM

## 2025-03-17 DIAGNOSIS — D64.9 ANEMIA, UNSPECIFIED TYPE: ICD-10-CM

## 2025-03-17 DIAGNOSIS — M79.672 FOOT PAIN, BILATERAL: ICD-10-CM

## 2025-03-17 DIAGNOSIS — I10 PRIMARY HYPERTENSION: ICD-10-CM

## 2025-03-17 DIAGNOSIS — E78.5 HYPERLIPIDEMIA, UNSPECIFIED HYPERLIPIDEMIA TYPE: ICD-10-CM

## 2025-03-17 DIAGNOSIS — J44.1 COPD WITH EXACERBATION: ICD-10-CM

## 2025-03-17 DIAGNOSIS — R60.0 BILATERAL LOWER EXTREMITY EDEMA: ICD-10-CM

## 2025-03-17 DIAGNOSIS — E66.01 MORBID OBESITY: ICD-10-CM

## 2025-03-17 NOTE — PROGRESS NOTES
Subjective   The ABCs of the Annual Wellness Visit  Medicare Wellness Visit      Joseph Mcdonald is a 69 y.o. patient who presents for a Medicare Wellness Visit.    The following portions of the patient's history were reviewed and   updated as appropriate: allergies, current medications, past family history, past medical history, past social history, past surgical history, and problem list.    Compared to one year ago, the patient's physical   health is the same.  Compared to one year ago, the patient's mental   health is the same.    Recent Hospitalizations:  He was not admitted to the hospital during the last year.     Current Medical Providers:  Patient Care Team:  Dexter Paredes MD as PCP - General (Internal Medicine)  Zach Obrien MD as Consulting Physician (Endocrinology)    Outpatient Medications Prior to Visit   Medication Sig Dispense Refill    albuterol (ACCUNEB) 1.25 MG/3ML nebulizer solution Take 3 mL by nebulization Every 6 (Six) Hours As Needed for Shortness of Air. 50 each 12    aspirin 81 MG EC tablet Take 1 tablet by mouth As Needed.      atorvastatin (LIPITOR) 20 MG tablet TAKE 1 TABLET BY MOUTH DAILY 90 tablet 1    diphenhydrAMINE (BENADRYL) 25 mg capsule Take 1 capsule by mouth Every 6 (Six) Hours As Needed for Itching.      doxazosin (CARDURA) 4 MG tablet TAKE ONE TABLET BY MOUTH ONCE NIGHTLY 90 tablet 2    fexofenadine (ALLEGRA) 180 MG tablet TAKE ONE TABLET BY MOUTH DAILY 30 tablet 3    furosemide (LASIX) 40 MG tablet TAKE 1 TABLET BY MOUTH TWICE A  tablet 2    glimepiride (AMARYL) 2 MG tablet Take 1 tablet by mouth 2 (Two) Times a Day.      metFORMIN (GLUCOPHAGE) 500 MG tablet Take 2 tablets by mouth 2 (Two) Times a Day With Meals. 360 tablet 2    pantoprazole (PROTONIX) 40 MG EC tablet TAKE 1 TABLET BY MOUTH DAILY 30 tablet 4    triamcinolone (KENALOG) 0.1 % ointment       dextromethorphan polistirex ER (Delsym) 30 MG/5ML Suspension Extended Release oral suspension Take 10 mL  "by mouth Every 12 (Twelve) Hours. (Patient not taking: Reported on 3/17/2025) 280 mL 1    glipizide (GLUCOTROL) 5 MG tablet Take 2 tablets by mouth 2 (Two) Times a Day Before Meals.      pioglitazone (ACTOS) 45 MG tablet Take 1 tablet by mouth Daily.       No facility-administered medications prior to visit.     No opioid medication identified on active medication list. I have reviewed chart for other potential  high risk medication/s and harmful drug interactions in the elderly.      Aspirin is on active medication list. Aspirin use is not indicated based on review of current medical condition/s. Risk of harm outweighs potential benefits. Patient instructed to discontinue this medication.  .      Patient Active Problem List   Diagnosis    Diabetes mellitus type 2, uncontrolled, with complications    HTN (hypertension)    Hyperlipidemia    LAP-BAND surgery status    Morbid obesity    Morbid obesity with BMI of 50.0-59.9, adult    Health care maintenance    Anasarca    Bilateral lower extremity edema    HERMINIA (obstructive sleep apnea)    Pulmonary hypertension, moderate to severe    Diabetes mellitus type II, uncontrolled    Acute hypoxemic respiratory failure    Acute on chronic diastolic (congestive) heart failure    Community acquired pneumonia    COVID-19 virus infection    Hyponatremia     Advance Care Planning Advance Directive is not on file.  ACP discussion was held with the patient during this visit. Patient does not have an advance directive, information provided.            Objective   Vitals:    03/17/25 1432   Pulse: 80   SpO2: (!) 83%   Weight: (!) 210 kg (463 lb 6.4 oz)   Height: 177.8 cm (70\")       Estimated body mass index is 66.49 kg/m² as calculated from the following:    Height as of this encounter: 177.8 cm (70\").    Weight as of this encounter: 210 kg (463 lb 6.4 oz).                Does the patient have evidence of cognitive impairment? No                                                          "                                       Health  Risk Assessment    Smoking Status:  Social History     Tobacco Use   Smoking Status Former    Current packs/day: 0.00    Average packs/day: 0.5 packs/day for 10.0 years (5.0 ttl pk-yrs)    Types: Cigarettes    Start date:     Quit date: 1970    Years since quittin.2   Smokeless Tobacco Never     Alcohol Consumption:  Social History     Substance and Sexual Activity   Alcohol Use Never       Fall Risk Screen  KATRINADI Fall Risk Assessment was completed, and patient is at LOW risk for falls.Assessment completed on:3/17/2025    Depression Screening   Little interest or pleasure in doing things? Not at all   Feeling down, depressed, or hopeless? Not at all   PHQ-2 Total Score 0      Health Habits and Functional and Cognitive Screening:      3/17/2025     2:41 PM   Functional & Cognitive Status   Do you have difficulty preparing food and eating? No   Do you have difficulty bathing yourself, getting dressed or grooming yourself? No   Do you have difficulty using the toilet? No   Do you have difficulty moving around from place to place? Yes   Do you have trouble with steps or getting out of a bed or a chair? Yes   Current Diet Other   Dental Exam Not up to date   Eye Exam Not up to date   Exercise (times per week) 0 times per week   Current Exercises Include No Regular Exercise   Do you need help using the phone?  No   Are you deaf or do you have serious difficulty hearing?  No   Do you need help to go to places out of walking distance? Yes   Do you need help shopping? Yes   Do you need help preparing meals?  No   Do you need help with housework?  No   Do you need help with laundry? No   Do you need help taking your medications? No   Do you need help managing money? No   Do you ever drive or ride in a car without wearing a seat belt? No   Have you felt unusual stress, anger or loneliness in the last month? No   Who do you live with? Alone   If you need help, do you have  trouble finding someone available to you? No   Have you been bothered in the last four weeks by sexual problems? No   Do you have difficulty concentrating, remembering or making decisions? No           Age-appropriate Screening Schedule:  Refer to the list below for future screening recommendations based on patient's age, sex and/or medical conditions. Orders for these recommended tests are listed in the plan section. The patient has been provided with a written plan.    Health Maintenance List  Health Maintenance   Topic Date Due    Pneumococcal Vaccine 50+ (1 of 2 - PCV) Never done    TDAP/TD VACCINES (1 - Tdap) Never done    COLORECTAL CANCER SCREENING  Never done    HEPATITIS C SCREENING  Never done    COVID-19 Vaccine (1 - 2024-25 season) Never done    URINE MICROALBUMIN-CREATININE RATIO (uACR)  04/04/2025 (Originally 5/11/2022)    ZOSTER VACCINE (1 of 2) 04/04/2025 (Originally 10/3/2007)    DIABETIC EYE EXAM  05/05/2025 (Originally 10/3/1965)    HEMOGLOBIN A1C  08/05/2025    LIPID PANEL  09/30/2025    BMI FOLLOWUP  01/17/2026    ANNUAL WELLNESS VISIT  03/17/2026    INFLUENZA VACCINE  Completed    AAA SCREEN ONCE  Completed                                                                                                                                                CMS Preventative Services Quick Reference  Risk Factors Identified During Encounter  Glaucoma or Family History of Glaucoma:  Ophthalmology Appointment Recommended    The above risks/problems have been discussed with the patient.  Pertinent information has been shared with the patient in the After Visit Summary.  An After Visit Summary and PPPS were made available to the patient.    Follow Up:   Next Medicare Wellness visit to be scheduled in 1 year.         Additional E&M Note during same encounter follows:  Patient has additional, significant, and separately identifiable condition(s)/problem(s) that require work above and beyond the Medicare  "Wellness Visit     Chief Complaint  Medicare Wellness-subsequent (Unable to hear bp)    Subjective   HPI      Review of Systems   Constitutional:  Negative for chills and fever.   Respiratory:  Positive for shortness of breath. Negative for cough.    Cardiovascular:  Negative for chest pain and palpitations.   Gastrointestinal:  Negative for constipation, diarrhea, nausea and vomiting.   Neurological:  Negative for dizziness.            Objective   Vital Signs:  Pulse 80   Ht 177.8 cm (70\")   Wt (!) 210 kg (463 lb 6.4 oz)   SpO2 (!) 83%   BMI 66.49 kg/m²   Physical Exam  Constitutional:       Appearance: Normal appearance.   HENT:      Head: Normocephalic and atraumatic.   Eyes:      Extraocular Movements: Extraocular movements intact.      Pupils: Pupils are equal, round, and reactive to light.   Cardiovascular:      Rate and Rhythm: Normal rate and regular rhythm.      Pulses: Normal pulses.      Heart sounds: Normal heart sounds.   Musculoskeletal:      Cervical back: Normal range of motion and neck supple.   Neurological:      Mental Status: He is alert.          Assessment and Plan    This 69-year-old patient presents today for Medicare wellness review.  He has a long history of COPD and pulmonary hypertension.  He is chronically short of breath with ambulation is noted in the office today with ambulation greater than 10 feet his O2 saturation decreased to 83% while sitting it is 92.  Note is made that he does not have any portable oxygen with him today.  He relates that the tank is too late to bring in and he would like to get have portable tanks.  He has not been seen by pulmonology in greater than 2 years.    We discussed the importance of keeping his LDL cholesterol less than 100.His last levels on 9/30/2024 where his LDL could be found to be 117.    He also relates that his feet are giving him more and more pain evaluation shows bilateral Hallisey all nails ingrowing.                    No orders of " the defined types were placed in this encounter.            Follow Up   No follow-ups on file.  Patient was given instructions and counseling regarding his condition or for health maintenance advice. Please see specific information pulled into the AVS if appropriate.

## 2025-03-18 LAB
ALBUMIN SERPL-MCNC: 3.5 G/DL (ref 3.5–5.2)
ALBUMIN/GLOB SERPL: 1.3 G/DL
ALP SERPL-CCNC: 134 U/L (ref 39–117)
ALT SERPL-CCNC: 8 U/L (ref 1–41)
AST SERPL-CCNC: 12 U/L (ref 1–40)
BASOPHILS # BLD AUTO: 0.04 10*3/MM3 (ref 0–0.2)
BASOPHILS NFR BLD AUTO: 0.4 % (ref 0–1.5)
BILIRUB SERPL-MCNC: 0.3 MG/DL (ref 0–1.2)
BUN SERPL-MCNC: 16 MG/DL (ref 8–23)
BUN/CREAT SERPL: 13.1 (ref 7–25)
CALCIUM SERPL-MCNC: 8.9 MG/DL (ref 8.6–10.5)
CHLORIDE SERPL-SCNC: 95 MMOL/L (ref 98–107)
CHOLEST SERPL-MCNC: 148 MG/DL (ref 0–200)
CHOLEST/HDLC SERPL: 4.48 {RATIO}
CO2 SERPL-SCNC: 38.3 MMOL/L (ref 22–29)
CREAT SERPL-MCNC: 1.22 MG/DL (ref 0.76–1.27)
EGFRCR SERPLBLD CKD-EPI 2021: 65 ML/MIN/1.73
EOSINOPHIL # BLD AUTO: 0.11 10*3/MM3 (ref 0–0.4)
EOSINOPHIL NFR BLD AUTO: 1.2 % (ref 0.3–6.2)
ERYTHROCYTE [DISTWIDTH] IN BLOOD BY AUTOMATED COUNT: 13.5 % (ref 12.3–15.4)
GLOBULIN SER CALC-MCNC: 2.6 GM/DL
GLUCOSE SERPL-MCNC: 120 MG/DL (ref 65–99)
HCT VFR BLD AUTO: 31.9 % (ref 37.5–51)
HDLC SERPL-MCNC: 33 MG/DL (ref 40–60)
HGB BLD-MCNC: 9.9 G/DL (ref 13–17.7)
IMM GRANULOCYTES # BLD AUTO: 0.04 10*3/MM3 (ref 0–0.05)
IMM GRANULOCYTES NFR BLD AUTO: 0.4 % (ref 0–0.5)
LDLC SERPL CALC-MCNC: 96 MG/DL (ref 0–100)
LYMPHOCYTES # BLD AUTO: 1.41 10*3/MM3 (ref 0.7–3.1)
LYMPHOCYTES NFR BLD AUTO: 15.7 % (ref 19.6–45.3)
MCH RBC QN AUTO: 28.1 PG (ref 26.6–33)
MCHC RBC AUTO-ENTMCNC: 31 G/DL (ref 31.5–35.7)
MCV RBC AUTO: 90.6 FL (ref 79–97)
MONOCYTES # BLD AUTO: 0.77 10*3/MM3 (ref 0.1–0.9)
MONOCYTES NFR BLD AUTO: 8.6 % (ref 5–12)
NEUTROPHILS # BLD AUTO: 6.63 10*3/MM3 (ref 1.7–7)
NEUTROPHILS NFR BLD AUTO: 73.7 % (ref 42.7–76)
NRBC BLD AUTO-RTO: 0 /100 WBC (ref 0–0.2)
PLATELET # BLD AUTO: 240 10*3/MM3 (ref 140–450)
POTASSIUM SERPL-SCNC: 4.9 MMOL/L (ref 3.5–5.2)
PROT SERPL-MCNC: 6.1 G/DL (ref 6–8.5)
RBC # BLD AUTO: 3.52 10*6/MM3 (ref 4.14–5.8)
SODIUM SERPL-SCNC: 142 MMOL/L (ref 136–145)
SPECIMEN STATUS: NORMAL
TRIGL SERPL-MCNC: 100 MG/DL (ref 0–150)
TSH SERPL DL<=0.005 MIU/L-ACNC: 2.51 UIU/ML (ref 0.27–4.2)
VLDLC SERPL CALC-MCNC: 19 MG/DL (ref 5–40)
WBC # BLD AUTO: 9 10*3/MM3 (ref 3.4–10.8)

## 2025-03-19 ENCOUNTER — TELEPHONE (OUTPATIENT)
Dept: FAMILY MEDICINE CLINIC | Facility: CLINIC | Age: 70
End: 2025-03-19

## 2025-03-19 NOTE — TELEPHONE ENCOUNTER
Caller: Joseph Mcdonald    Relationship to patient: Self    Best call back number: [phone]    Patient is needing: PATIENT CALLED WITH INFORMATION TO GIVE TO DR. ROGERS. THIS IS TO RE CERTIFY HIS OXYGEN THRU [employer].     [facility]   [phone]

## 2025-03-25 ENCOUNTER — RESULTS FOLLOW-UP (OUTPATIENT)
Dept: FAMILY MEDICINE CLINIC | Facility: CLINIC | Age: 70
End: 2025-03-25
Payer: MEDICARE

## 2025-03-25 NOTE — LETTER
Joseph Mcdonald  [address]  [place] [address]    March 26, 2025     Dear Mr. Mcdonald:    Below are the results from your recent visit:    Resulted Orders   Comprehensive Metabolic Panel   Result Value Ref Range    Glucose 120 (H) 65 - 99 mg/dL    BUN 16 8 - 23 mg/dL    Creatinine 1.22 0.76 - 1.27 mg/dL    EGFR Result 65.0 >60.0 mL/min/1.73      Comment:      GFR Categories in Chronic Kidney Disease (CKD)/X09/  /X09/  GFR Category          GFR (mL/min/1.73)    Interpretation  G1/X09/                    90 or greater/X09/        Normal or high  (1)  G2//                    60-89                Mild decrease  (1)  G3a                   45-59                Mild to moderate  decrease  G3b                   30-44                Moderate to  severe decrease  G4                    15-29                Severe decrease  G5                    14 or less           Kidney failure//  /[id number]/  (1)In the absence of evidence of kidney disease, neither  GFR category G1 or G2 fulfill the criteria for CKD.  eGFR calculation 2021 CKD-EPI creatinine equation, which  does not include race as a factor      BUN/Creatinine Ratio 13.1 7.0 - 25.0    Sodium 142 136 - 145 mmol/L    Potassium 4.9 3.5 - 5.2 mmol/L    Chloride 95 (L) 98 - 107 mmol/L    Total CO2 38.3 (H) 22.0 - 29.0 mmol/L    Calcium 8.9 8.6 - 10.5 mg/dL    Total Protein 6.1 6.0 - 8.5 g/dL    Albumin 3.5 3.5 - 5.2 g/dL    Globulin 2.6 gm/dL    A/G Ratio 1.3 g/dL    Total Bilirubin 0.3 0.0 - 1.2 mg/dL    Alkaline Phosphatase 134 (H) 39 - 117 U/L    AST (SGOT) 12 1 - 40 U/L    ALT (SGPT) 8 1 - 41 U/L   Lipid Panel With / Chol / HDL Ratio   Result Value Ref Range    Total Cholesterol 148 0 - 200 mg/dL      Comment:      Cholesterol Reference Ranges  (U.S. Department of Health and Human Services ATP III  Classifications)  Desirable          <200 mg/dL  Borderline High    200-239 mg/dL  High Risk          >240 mg/dL  Triglyceride Reference Ranges  (U.S.  Department of Health and Human Services ATP III  Classifications)  Normal           <150 mg/dL  Borderline High  150-199 mg/dL  High             200-499 mg/dL  Very High        >500 mg/dL  HDL Reference Ranges  (U.S. Department of Health and Human Services ATP III  Classifications)  Low     <40 mg/dl (major risk factor for CHD)  High    >60 mg/dl ('negative' risk factor for CHD)  LDL Reference Ranges  (U.S. Department of Health and Human Services ATP III  Classifications)  Optimal          <100 mg/dL  Near Optimal     100-129 mg/dL  Borderline High  130-159 mg/dL  High             160-189 mg/dL  Very High        >189 mg/dL  LDL is calculated using the NIH LDL-C calculation.      Triglycerides 100 0 - 150 mg/dL    HDL Cholesterol 33 (L) 40 - 60 mg/dL    VLDL Cholesterol Wil 19 5 - 40 mg/dL    LDL Chol Calc ([facility]) 96 0 - 100 mg/dL    Chol/HDL Ratio 4.48    CBC & Differential   Result Value Ref Range    WBC 9.00 3.40 - 10.80 10*3/mm3    RBC 3.52 (L) 4.14 - 5.80 10*6/mm3    Hemoglobin 9.9 (L) 13.0 - 17.7 g/dL    Hematocrit 31.9 (L) 37.5 - 51.0 %    MCV 90.6 79.0 - 97.0 fL    MCH 28.1 26.6 - 33.0 pg    MCHC 31.0 (L) 31.5 - 35.7 g/dL    RDW 13.5 12.3 - 15.4 %    Platelets 240 140 - 450 10*3/mm3    Neutrophil Rel % 73.7 42.7 - 76.0 %    Lymphocyte Rel % 15.7 (L) 19.6 - 45.3 %    Monocyte Rel % 8.6 5.0 - 12.0 %    Eosinophil Rel % 1.2 0.3 - 6.2 %    Basophil Rel % 0.4 0.0 - 1.5 %    Neutrophils Absolute 6.63 1.70 - 7.00 10*3/mm3    Lymphocytes Absolute 1.41 0.70 - 3.10 10*3/mm3    Monocytes Absolute 0.77 0.10 - 0.90 10*3/mm3    Eosinophils Absolute 0.11 0.00 - 0.40 10*3/mm3    Basophils Absolute 0.04 0.00 - 0.20 10*3/mm3    Immature Granulocyte Rel % 0.4 0.0 - 0.5 %    Immature Grans Absolute 0.04 0.00 - 0.05 10*3/mm3    nRBC 0.0 0.0 - 0.2 /100 WBC   TSH Rfx On Abnormal To Free T4   Result Value Ref Range    TSH 2.510 0.270 - 4.200 uIU/mL   Specimen Status Report   Result Value Ref Range    Specimen Status Comment        Comment:      Unable to Void  Unable to Void  The patient was not able to render a urine sample and has been  instructed to return for a urine collection at their earliest  convenience.  The urine testing that you have requested has  been deleted from this report.  When the patient returns and  provides a urine specimen, the urine testing will be performed  and separately reported.         Your LDL/bad cholesterol is less than 100 which is our goal.     If you have any questions or concerns, please don't hesitate to call.         Sincerely,        Dexter Paredes MD

## 2025-03-28 ENCOUNTER — OFFICE VISIT (OUTPATIENT)
Age: 70
End: 2025-03-28
Payer: MEDICARE

## 2025-03-28 VITALS
WEIGHT: 315 LBS | SYSTOLIC BLOOD PRESSURE: 122 MMHG | OXYGEN SATURATION: 93 % | DIASTOLIC BLOOD PRESSURE: 80 MMHG | BODY MASS INDEX: 45.1 KG/M2 | HEART RATE: 89 BPM | HEIGHT: 70 IN

## 2025-03-28 DIAGNOSIS — E66.2 CLASS 3 OBESITY WITH ALVEOLAR HYPOVENTILATION, SERIOUS COMORBIDITY, AND BODY MASS INDEX (BMI) OF 60.0 TO 69.9 IN ADULT: ICD-10-CM

## 2025-03-28 DIAGNOSIS — E66.813 CLASS 3 OBESITY WITH ALVEOLAR HYPOVENTILATION, SERIOUS COMORBIDITY, AND BODY MASS INDEX (BMI) OF 60.0 TO 69.9 IN ADULT: ICD-10-CM

## 2025-03-28 DIAGNOSIS — I50.32 CHRONIC HEART FAILURE WITH PRESERVED EJECTION FRACTION (HFPEF): Primary | ICD-10-CM

## 2025-03-28 DIAGNOSIS — I10 PRIMARY HYPERTENSION: ICD-10-CM

## 2025-03-28 NOTE — PROGRESS NOTES
Subjective:     Encounter Date:10/26/2022      Patient ID: Joseph Mcdonald is a 69 y.o. male.    Chief Complaint:  Follow-up of HFpEF    HPI:   69 y.o. male with severe obesity, hypertension, hyperlipidemia, diabetes, HFpEF, postcapillary pulmonary hypertension who presents for follow-up.  Patient was last seen in May 2023 and at that time, he felt well without complaints. He had an echocardiogram performed December 2024 revealed an EF of 70% and an aortic root of 4.2 cm.     Per prior notes:  Patient was last seen October 20, 2022 follow-up after right heart cath.  Since that visit, he unfortunately had a prolonged hospitalization for severe COVID-pneumonia.  He required an ICU stay due to acute hypoxic respiratory failure requiring intubation.  He was extubated in late December and subsequently discharged to rehab where he stayed for 3 weeks. He subsequently underwent PEG tube removal on 3/27/2023.  He presents today for follow-up.  He feels closer to his baseline and has no current complaints.    Patient had undergone echocardiogram on January 25, 2022 evaluation of shortness of breath at [facility] which revealed normal EF at 70%, mild LVH, normal left atrial pressures, mildly dilated LA, RVSP of 63, mild dilation of the aortic root of 4.1 cm.  He subsequently followed up with his primary care in April 2022 and was noted to have diffuse wheezing and crackles so he was started on Lasix.  He underwent right heart catheterization on 9/12/2022 for evaluation of pulmonary hypertension and that revealed postcapillary pulmonary hypertension with an RA of 9, RV 40/6, pulmonary artery pressure 46/22/34, wedge 23, cardiac output 7.41, PVR 1.48.  He has been compliant with his Lasix and notes frequent urination.  His current weight is 430, 8 pounds lighter than on 9/12/2022.  With respect to his shortness of breath, he does note significant improvement.  He is experiencing mild dyspnea with some exertion.    The following  portions of the patient's history were reviewed and updated as appropriate: allergies, current medications, past family history, past medical history, past social history, past surgical history and problem list.     REVIEW OF SYSTEMS:   All systems reviewed.  Pertinent positives identified in HPI.  All other systems are negative.    Past Medical History:   Diagnosis Date    Diabetes mellitus     Hyperlipidemia     Hypertension        History reviewed. No pertinent family history.    Social History     Socioeconomic History    Marital status: Single   Tobacco Use    Smoking status: Former     Current packs/day: 0.00     Average packs/day: 0.5 packs/day for 10.0 years (5.0 ttl pk-yrs)     Types: Cigarettes     Start date:      Quit date:      Years since quittin.2    Smokeless tobacco: Never   Vaping Use    Vaping status: Never Used   Substance and Sexual Activity    Alcohol use: Never    Drug use: Never    Sexual activity: Defer       Allergies   Allergen Reactions    Acetaminophen Anaphylaxis    Codeine Diarrhea and Nausea And Vomiting    Penicillins Other (See Comments)    Sulfa Antibiotics Other (See Comments)       Past Surgical History:   Procedure Laterality Date    CARDIAC CATHETERIZATION N/A 2022    Procedure: Right Heart Cath;  Surgeon: Frank Rodríguez MD;  Location: [facility] CATH INVASIVE LOCATION;  Service: Cardiology;  Laterality: N/A;    LAPAROSCOPIC GASTRIC BANDING N/A              ECG 12 Lead    Date/Time: 3/28/2025 2:10 PM  Performed by: Jose Miguel Handy MD    Authorized by: Jose Miguel Handy MD  Comparison: compared with previous ECG from 2023  Similar to previous ECG  Rhythm: sinus rhythm  Conduction: incomplete right bundle branch block  Q waves: III      Clinical impression: abnormal EKG             Objective:         Vitals:    25 1351   BP: 122/80   Pulse: 89   SpO2: 93%         PHYSICAL EXAM:  GEN: well appearing, obese, in NAD   HEENT: NCAT, EOMI, moist mucus membranes    Respiratory: Distant breath sounds but no crackles, wheezing  CV: normal rate, regular rhythm, normal S1, S2, no murmurs, rubs, gallops, +2 radial pulses b/l  GI: soft, protuberant, nontender, nondistended  MSK: Trace bilateral edema with chronic venous stasis changes  Skin: no rash, warm, dry  Heme/Lymph: no bruising or bleeding  Psych: organized thought, normal behavior and affect  Neuro: Alert and Oriented x 3, grossly normal motor function        Assessment:         (I50.32) Chronic heart failure with preserved ejection fraction (HFpEF)    (I10) Primary hypertension    (E66.813,  E66.2,  Z68.44) Class 3 obesity with alveolar hypoventilation, serious comorbidity, and body mass index (BMI) of 60.0 to 69.9 in adult    69 y.o. male with severe obesity, hypertension, hyperlipidemia, diabetes, HFpEF, postcapillary pulmonary hypertension who presents for follow-up.         Plan:       #Pulmonary hypertension  Mild postcapillary pulmonary hypertension with mean PA of 34 and PVR of 1.5.  It seems that he has responded well to Lasix.  He has trace residual lower extremity edema.  - Continue Lasix 40 mg BID      #Aortic root dilatation  Echo 2022 with aortic root of 4.1 cm. Echo 2024 with aortic root 4.2 cm.  - Can repeat echo every two years    Dr Paredes, thank you very much for referring this kind patient to me. Please call me with any questions or concerns. I will see the patient again in the office in 6 months.         Jose Miguel Handy MD  03/28/25  [facility]    Outpatient Encounter Medications as of 3/28/2025   Medication Sig Dispense Refill    albuterol (ACCUNEB) 1.25 MG/3ML nebulizer solution Take 3 mL by nebulization Every 6 (Six) Hours As Needed for Shortness of Air. 50 each 12    aspirin 81 MG EC tablet Take 1 tablet by mouth As Needed.      atorvastatin (LIPITOR) 20 MG tablet TAKE 1 TABLET BY MOUTH DAILY 90 tablet 1    diphenhydrAMINE (BENADRYL) 25 mg capsule Take 1 capsule by mouth Every 6 (Six)  Hours As Needed for Itching.      doxazosin (CARDURA) 4 MG tablet TAKE ONE TABLET BY MOUTH ONCE NIGHTLY 90 tablet 2    fexofenadine (ALLEGRA) 180 MG tablet TAKE ONE TABLET BY MOUTH DAILY 30 tablet 3    furosemide (LASIX) 40 MG tablet TAKE 1 TABLET BY MOUTH TWICE A  tablet 2    glimepiride (AMARYL) 2 MG tablet Take 1 tablet by mouth 2 (Two) Times a Day.      metFORMIN (GLUCOPHAGE) 500 MG tablet Take 2 tablets by mouth 2 (Two) Times a Day With Meals. 360 tablet 2    pantoprazole (PROTONIX) 40 MG EC tablet TAKE 1 TABLET BY MOUTH DAILY 30 tablet 4    triamcinolone (KENALOG) 0.1 % ointment       glipizide (GLUCOTROL) 5 MG tablet Take 2 tablets by mouth 2 (Two) Times a Day Before Meals.      pioglitazone (ACTOS) 45 MG tablet Take 1 tablet by mouth Daily.       No facility-administered encounter medications on file as of 3/28/2025.

## 2025-04-28 ENCOUNTER — OFFICE VISIT (OUTPATIENT)
Dept: NEUROLOGY | Facility: CLINIC | Age: 70
End: 2025-04-28
Payer: MEDICARE

## 2025-04-28 VITALS
OXYGEN SATURATION: 97 % | BODY MASS INDEX: 45.1 KG/M2 | SYSTOLIC BLOOD PRESSURE: 122 MMHG | HEIGHT: 70 IN | WEIGHT: 315 LBS | HEART RATE: 94 BPM | DIASTOLIC BLOOD PRESSURE: 78 MMHG

## 2025-04-28 DIAGNOSIS — G62.9 PERIPHERAL POLYNEUROPATHY: Primary | ICD-10-CM

## 2025-04-28 RX ORDER — GABAPENTIN 100 MG/1
CAPSULE ORAL
Qty: 120 CAPSULE | Refills: 2 | Status: SHIPPED | OUTPATIENT
Start: 2025-04-28

## 2025-04-28 NOTE — PROGRESS NOTES
[facility] Neurology  Patient ID: Joseph Mcdonald  Age: 69 y.o.   Chief compliant:   Chief Complaint   Patient presents with    Numbness       Initial HPI (4/28/2025):    Joseph Mcdonald is a 69 y.o. left-handed male with HTN, HLD, chronic diastolic heart failure, T2DM, obesity (BMI 65), pulmonary hypertension, HERMINIA who presents for evaluation of leg pain.    Reports 2-3 years of pain throughout his feet up to his mid shin. It feels tingling and needles stabbing his feet and slight burning. At times it is 11/10 in intensity. It is in both legs equally. He gets some slight relief getting out of bed at night when symptoms are ongoing. Denies significant weakness. No falls. He uses the rollator for support. He has used this since he had COVID pneumonia with prolonged hospitalization for a month necessitating rehabilitation. He has these painful symptoms before such hospitalization. No symptoms in the arms. Reports back pain since his hospital stay. He has been told it is related to leg swelling and poor heart function. No prior chemotherapy. He has had diabetes since late 90's. He sees podiatry - denies diagnosis besides toenail issues.     He has been prescribed gabapentin. He reports after reading the side effects, he refused to take it. He denies every taking it. Hospital discharge summary in 2021 reports good response to it during his stay. He has put triamcinolone on his legs.     Pertinent FHx/Social: oldest sister has the same issues. No tobacco or alcohol use regularly. Worked in road maintenance      Vitals:    04/28/25 1408   BP: 122/78   Pulse: 94   SpO2: 97%       The following portions of the patient's history were reviewed and updated as appropriate: allergies, current medications, past family history, past medical history, past social history, past surgical history and problem list.    Neurological Exam  Mental Status  Awake, alert and oriented to person, place and time. Recent and remote memory are  intact. Speech is normal. Language is fluent with no aphasia. Attention and concentration are normal. Fund of knowledge is appropriate for level of education.    Cranial Nerves  CN II: Visual acuity is normal. Visual fields full to confrontation.  CN III, IV, VI: Extraocular movements intact bilaterally. Normal lids and orbits bilaterally. Pupils equal round and reactive to light bilaterally.  CN V: Facial sensation is normal.  CN VII: Full and symmetric facial movement.  CN IX, X: Palate elevates symmetrically  CN XI: Shoulder shrug strength is normal.  CN XII: Tongue midline without atrophy or fasciculations.    Motor  Normal muscle bulk throughout. Normal muscle tone. Strength is 5/5 in all four extremities except as noted.    Sensory  Light touch abnormality: Diminished in feet. Pinprick abnormality: Patchy loss in hands and feet to mid shin, hyperalgesia in foot bilaterally. Vibration abnormality: Absent at toe, normal at knee.     Reflexes                                            Right                      Left  Brachioradialis                    1+                         2+  Biceps                                 1+                         1+  Triceps                                1+                         1+  Patellar                                2+                         2+  Achilles                                1+                         1+    Right pathological reflexes: Ankle clonus absent.  Left pathological reflexes: Ankle clonus absent.    Coordination    Finger-to-nose, rapid alternating movements and heel-to-shin normal bilaterally without dysmetria.    Gait  Casual gait: Antalgic gait. Romberg is present. Weakly so.      Physical Exam  Eyes:      General: Lids are normal.      Extraocular Movements: Extraocular movements intact.      Pupils: Pupils are equal, round, and reactive to light.   Skin:     Comments: Chronic stasis dermatitis to bilateral mid shin   Neurological:       Coordination: Coordination is intact. Romberg sign positive.      Deep Tendon Reflexes:      Reflex Scores:       Tricep reflexes are 1+ on the right side and 1+ on the left side.       Bicep reflexes are 1+ on the right side and 1+ on the left side.       Brachioradialis reflexes are 1+ on the right side and 2+ on the left side.       Patellar reflexes are 2+ on the right side and 2+ on the left side.       Achilles reflexes are 1+ on the right side and 1+ on the left side.  Psychiatric:         Speech: Speech normal.         Imaging: No new studies  Labs reviewed including CMP, CBC, TSH, A1C    Assessment/Plan:    Joseph Mcdonald is a 69 y.o. male clinic regarding painful symptoms in his legs.  Neurological examination is noted for hypoalgesia to pinprick and reduced vibratory sensation throughout the lower extremities.  Findings are most consistent with a peripheral polyneuropathy.  Etiology is most likely related to chronic diabetes, although does not appear to have had extensive evaluation for alternative etiologies.  Will perform further lab screening at this time.  He reports symptoms are quite painful and has significant hypersensitivity to the legs. He is interested in treatment for such, but has expressed caution regarding any medications that could harm his heart, kidney, or liver.  Reviewed possible side effects of gabapentin including sedation, hypertension, and worsening leg swelling.  Gabapentin should not be directly harmful to the kidneys, although must take caution in appropriately dosing for his renal dysfunction.  He is agreeable to a trial of a low-dose at this time.         Diagnoses and all orders for this visit:    1. Peripheral polyneuropathy (Primary)  -     gabapentin (NEURONTIN) 100 MG capsule; Take 2 capsules at night for two weeks, then increase to 2 capsules twice a day.  Dispense: 120 capsule; Refill: 2  -     Vitamin B6  -     Vitamin B12  -     Protein Elec + Interp, Serum  -      Immunofixation, Serum  -     Heavy Metals, Blood  -     Copper, Serum         Plan:  Labs as above  Trial of low-dose gabapentin 200 mg nightly, increase to 200 mg twice daily if tolerated.    Ed Smith MD

## 2025-05-01 LAB
ALBUMIN SERPL ELPH-MCNC: 2.8 G/DL (ref 2.9–4.4)
ALBUMIN/GLOB SERPL: 0.7 {RATIO} (ref 0.7–1.7)
ALPHA1 GLOB SERPL ELPH-MCNC: 0.3 G/DL (ref 0–0.4)
ALPHA2 GLOB SERPL ELPH-MCNC: 1.2 G/DL (ref 0.4–1)
ARSENIC BLD-MCNC: 2 UG/L (ref 0–9)
B-GLOBULIN SERPL ELPH-MCNC: 1.3 G/DL (ref 0.7–1.3)
COPPER SERPL-MCNC: 128 UG/DL (ref 69–132)
GAMMA GLOB SERPL ELPH-MCNC: 1 G/DL (ref 0.4–1.8)
GLOBULIN SER CALC-MCNC: 3.8 G/DL (ref 2.2–3.9)
IGA SERPL-MCNC: 230 MG/DL (ref 61–437)
IGG SERPL-MCNC: 985 MG/DL (ref 603–1613)
IGM SERPL-MCNC: 28 MG/DL (ref 20–172)
LABORATORY COMMENT REPORT: ABNORMAL
LEAD BLDV-MCNC: <1 UG/DL (ref 0–3.4)
M PROTEIN SERPL ELPH-MCNC: ABNORMAL G/DL
MERCURY BLD-MCNC: <1 UG/L (ref 0–14.9)
PROT PATTERN SERPL ELPH-IMP: ABNORMAL
PROT PATTERN SERPL IFE-IMP: NORMAL
PROT SERPL-MCNC: 6.6 G/DL (ref 6–8.5)
PYRIDOXAL PHOS SERPL-MCNC: 3.3 UG/L (ref 3.4–65.2)
VIT B12 SERPL-MCNC: 282 PG/ML (ref 232–1245)

## 2025-05-02 ENCOUNTER — RESULTS FOLLOW-UP (OUTPATIENT)
Dept: NEUROLOGY | Facility: CLINIC | Age: 70
End: 2025-05-02
Payer: MEDICARE

## 2025-05-02 DIAGNOSIS — E53.8 B12 DEFICIENCY: ICD-10-CM

## 2025-05-02 DIAGNOSIS — G62.9 PERIPHERAL POLYNEUROPATHY: Primary | ICD-10-CM

## 2025-05-12 ENCOUNTER — OFFICE VISIT (OUTPATIENT)
Age: 70
End: 2025-05-12
Payer: MEDICARE

## 2025-05-12 VITALS — WEIGHT: 315 LBS | BODY MASS INDEX: 45.1 KG/M2 | RESPIRATION RATE: 20 BRPM | HEIGHT: 70 IN

## 2025-05-12 DIAGNOSIS — L84 CORNS: ICD-10-CM

## 2025-05-12 DIAGNOSIS — B35.1 ONYCHOMYCOSIS: ICD-10-CM

## 2025-05-12 DIAGNOSIS — E11.42 TYPE 2 DIABETES MELLITUS WITH DIABETIC POLYNEUROPATHY, WITHOUT LONG-TERM CURRENT USE OF INSULIN: ICD-10-CM

## 2025-05-12 DIAGNOSIS — M20.41 HAMMER TOES OF BOTH FEET: ICD-10-CM

## 2025-05-12 DIAGNOSIS — I89.0 LYMPHEDEMA: Primary | ICD-10-CM

## 2025-05-12 DIAGNOSIS — I87.2 VENOUS INSUFFICIENCY: ICD-10-CM

## 2025-05-12 DIAGNOSIS — M20.42 HAMMER TOES OF BOTH FEET: ICD-10-CM

## 2025-05-12 RX ORDER — GLIMEPIRIDE 4 MG/1
4 TABLET ORAL
COMMUNITY
Start: 2025-05-07 | End: 2026-05-07

## 2025-05-12 NOTE — PROGRESS NOTES
05/12/2025  Foot and Ankle Surgery - New Patient   Provider: Dr. Aleks Mullins DPM  Location: [facility]    Subjective:  Joseph Mcdonald is a 69 y.o. male presents to clinic with complaints of bilateral lower extremity swelling, hammertoe deformities and thickened painful toenails.  Patient is type II diabetic on metformin.  Patient states he is unsure what his recent A1c was.  Most recent A1c May of last year was 9.1.  Patient states he has had back issues preventing him from wearing compression stockings bilateral lower extremities.  Patient states duskiness tightness and swelling to bilateral lower extremities.  Patient also states pain with ambulation of his toes bilaterally due to elongated nails.  Patient is wearing diabetic shoes with custom inserts that he has had for about a year.  Patient states he has had compression stockings in the past but is not able to get them over his calves and cannot reach his feet to apply.  Patient does use a rolling walker for ambulation.    Chief Complaint   Patient presents with    Left Foot - Ingrown Toenail     DFE - ingrown and neuropathy    Right Foot - Ingrown Toenail     DFE - ingrown and neuropathy       Allergies   Allergen Reactions    Acetaminophen Anaphylaxis    Codeine Diarrhea and Nausea And Vomiting    Penicillins Other (See Comments)    Sulfa Antibiotics Other (See Comments)       Past Medical History:   Diagnosis Date    Diabetes mellitus     Hyperlipidemia     Hypertension        Past Surgical History:   Procedure Laterality Date    CARDIAC CATHETERIZATION N/A 9/12/2022    Procedure: Right Heart Cath;  Surgeon: Frank Rodríguez MD;  Location: [facility] INVASIVE LOCATION;  Service: Cardiology;  Laterality: N/A;    LAPAROSCOPIC GASTRIC BANDING N/A     2014       History reviewed. No pertinent family history.    Social History     Socioeconomic History    Marital status: Single   Tobacco Use    Smoking status: Former     Current packs/day: 0.00      "Average packs/day: 0.5 packs/day for 10.0 years (5.0 ttl pk-yrs)     Types: Cigarettes     Start date:      Quit date: 1970     Years since quittin.3     Passive exposure: Never    Smokeless tobacco: Never   Vaping Use    Vaping status: Never Used   Substance and Sexual Activity    Alcohol use: Never    Drug use: Never    Sexual activity: Defer        Current Outpatient Medications on File Prior to Visit   Medication Sig Dispense Refill    albuterol (ACCUNEB) 1.25 MG/3ML nebulizer solution Take 3 mL by nebulization Every 6 (Six) Hours As Needed for Shortness of Air. 50 each 12    aspirin 81 MG EC tablet Take 1 tablet by mouth As Needed.      atorvastatin (LIPITOR) 20 MG tablet TAKE 1 TABLET BY MOUTH DAILY 90 tablet 1    cyanocobalamin ([employer] Vitamin B-12) 1000 MCG tablet Take 1 tablet by mouth Daily. 90 tablet 3    diphenhydrAMINE (BENADRYL) 25 mg capsule Take 1 capsule by mouth Every 6 (Six) Hours As Needed for Itching.      doxazosin (CARDURA) 4 MG tablet TAKE ONE TABLET BY MOUTH ONCE NIGHTLY 90 tablet 2    furosemide (LASIX) 40 MG tablet TAKE 1 TABLET BY MOUTH TWICE A  tablet 2    gabapentin (NEURONTIN) 100 MG capsule Take 2 capsules at night for two weeks, then increase to 2 capsules twice a day. 120 capsule 2    glimepiride (AMARYL) 4 MG tablet Take 1 tablet by mouth.      metFORMIN (GLUCOPHAGE) 500 MG tablet Take 2 tablets by mouth 2 (Two) Times a Day With Meals. 360 tablet 2    pantoprazole (PROTONIX) 40 MG EC tablet TAKE 1 TABLET BY MOUTH DAILY 30 tablet 4    triamcinolone (KENALOG) 0.1 % ointment       glipizide (GLUCOTROL) 5 MG tablet Take 2 tablets by mouth 2 (Two) Times a Day Before Meals.      pioglitazone (ACTOS) 45 MG tablet Take 1 tablet by mouth Daily.      [DISCONTINUED] glimepiride (AMARYL) 2 MG tablet Take 1 tablet by mouth 2 (Two) Times a Day.       No current facility-administered medications on file prior to visit.         Objective   Resp 20   Ht 177.8 cm (70\")   Wt (!) 207 kg " (457 lb)   BMI 65.57 kg/m²     Foot/Ankle Exam    GENERAL  Diabetic foot exam performed    Appearance:  appears stated age and obese  Orientation:  AAOx3  Affect:  appropriate  Gait:  shuffling  Assistance:  independent and walker  Right shoe gear: diabetic shoe  Left shoe gear: diabetic shoe    VASCULAR     Right Foot Vascularity   Dorsalis pedis:  Doppler  Posterior tibial:  Doppler  Skin temperature:  warm  Edema grading:  3+ and pitting  CFT:  3  Pedal hair growth:  Absent  Varicosities:  moderate varicosities     Left Foot Vascularity   Dorsalis pedis:  Doppler  Posterior tibial:  Doppler  Skin temperature:  warm  Edema grading:  3+ and pitting  CFT:  3  Pedal hair growth:  Absent  Varicosities:  moderate varicosities     NEUROLOGIC     Right Foot Neurologic   Light touch sensation: diminished  Vibratory sensation: diminished  Hot/Cold sensation: normal  Protective Sensation using [place]uri Monofilament:   Sites intact: 7  Sites tested: 10  Normal reflexes    Achilles reflex:  2+  Babinski reflex:  2+     Left Foot Neurologic   Light touch sensation: diminished  Vibratory sensation: diminished  Hot/Cold sensation:  normal  Protective Sensation using [place]uri Monofilament:   Sites intact: 6  Sites tested: 10  Normal reflexes    Achilles reflex:  2+  Babinski reflex:  2+    MUSCULOSKELETAL     Right Foot Musculoskeletal   Ecchymosis:  (Duskiness and ecchymosis to entire right leg extending to dorsal forefoot)  Tenderness:  (Hyper Nativity with palpation of lesser digits)  Hammertoe:  Second toe, third toe and fourth toe     Left Foot Musculoskeletal   Tenderness:  (Hypertensive to be with palpation of all digits left foot)  Hammertoe:  Second toe, third toe and fourth toe    MUSCLE STRENGTH     Right Foot Muscle Strength   Normal strength    Foot dorsiflexion:  5  Foot plantar flexion:  5  Foot inversion:  5  Foot eversion:  5     Left Foot Muscle Strength   Normal strength    Foot dorsiflexion:   5  Foot plantar flexion:  5  Foot inversion:  5  Foot eversion:  5    RANGE OF MOTION     Right Foot Range of Motion   Foot and ankle ROM within normal limits    Ankle dorsiflexion: decreased   Ankle plantar flexion: decreased      Left Foot Range of Motion   Foot and ankle ROM within normal limits    Ankle dorsiflexion: decreased  Ankle plantar flexion: decreased    DERMATOLOGIC      Right Foot Dermatologic   Skin  Positive for corn, dryness and erythema.   Nails  1.  Positive for elongated, abnormal thickness and dystrophic nail.  2.  Positive for elongated, abnormal thickness and dystrophic nail.  3.  Positive for elongated, abnormal thickness and dystrophic nail.  4.  Positive for elongated, abnormal thickness and dystrophic nail.  5.  Positive for elongated, abnormal thickness and dystrophic nail.     Left Foot Dermatologic   Skin  Positive for corn and erythema.   Nails  1.  Positive for elongated, abnormal thickness and dystrophic nail.  2.  Positive for elongated, abnormal thickness and dystrophic nail.  3.  Positive for elongated, abnormal thickness and dystrophic nail.  4.  Positive for elongated, abnormally thick and dystrophic nail.  5.  Positive for elongated, abnormally thick and dystrophic nail.        Assessment & Plan   Diagnoses and all orders for this visit:    1. Lymphedema (Primary)    2. Venous insufficiency    3. Type 2 diabetes mellitus with diabetic polyneuropathy, without long-term current use of insulin    4. Onychomycosis    5. Hammer toes of both feet    6. Corns       Explained importance of diabetic foot care, daily foot checks, and glycemic control. Patient should check both feet on a daily basis, monitor and control blood sugars, make sure that both feet and in between toes are towel dried after baths or showers. Avoid barefoot walking at all times.  I discussed wearing white socks and checking shoes before wearing them. Patient was given information on proper foot care. Call the  office at the first signs of a wound or with signs of infection.    Discussed with the patient concerning their lower extremity lymphedema.  Treatment options discussed including exercise, manual lymphatic drainage, use of intermittent pneumatic compression pumps, compression stockings, and good skin care.  Patient has tried compression stockings in the past but cannot apply them due to immobilization issues as well as calf size.  Patient to elevate affected extremity daily.  Will refer patient to [facility] tab home pneumatic compression pumps.    Nail debridement: Both feet x10    Consent and time out was performed before proceeding with the procedure. Nails were debrided with a nail nipper without complication.  No anesthesia was required.  Indications for procedure were thickened, dystrophic, and fungal appearing nails which are difficult to trim.  Proper self-care and technique was discussed with patient.  Patient was stable after procedure.    Callus paring performed with #15 blade to the following locations.  Plantar hallux IPJ bilaterally, plantar fifth MPJ bilaterally    Patient currently taking gabapentin 100 mg 3 times a day for neuropathy.  He has only been taking the medication for 5 days and has not had any relief of symptoms.  Discussed with patient loading dose and to expect no symptoms relieved for about 3 weeks.  Patient will continue to increase gabapentin as prescribed.  Discussed with patient topical antineuropathic pain cream and we may prescribe if requested for patient.    Patient to follow-up in 3 months for preventative diabetic footcare.           Procedures     Munir Mullins DPM

## 2025-05-13 ENCOUNTER — PATIENT ROUNDING (BHMG ONLY) (OUTPATIENT)
Age: 70
End: 2025-05-13
Payer: MEDICARE

## 2025-05-19 RX ORDER — DOXAZOSIN 4 MG/1
4 TABLET ORAL NIGHTLY
Qty: 90 TABLET | Refills: 2 | Status: SHIPPED | OUTPATIENT
Start: 2025-05-19

## 2025-06-09 ENCOUNTER — TELEPHONE (OUTPATIENT)
Dept: NEUROLOGY | Facility: CLINIC | Age: 70
End: 2025-06-09

## 2025-06-09 NOTE — TELEPHONE ENCOUNTER
Caller: Joseph Mcdonald    Relationship:  Self    Best call back number: [phone] (home)       PATIENT CALLED REQUESTING TO CANCEL SAME DAY APPT.    Did the patient call AFTER the start time of their scheduled appointment?  []YES  [x]NO    Was the patient's appointment rescheduled? []YES  [x]NO    Any additional information: UNABLE TO R.S DUE TO PROVIDER IS IN OFFICE ONLY TO SCHEDULING AT THIS TIME.     PT STATED THAT HE WAS UP ALL NIGHT AND HAD NO ENERGY TO COME IN TODAY

## 2025-06-13 RX ORDER — PANTOPRAZOLE SODIUM 40 MG/1
40 TABLET, DELAYED RELEASE ORAL DAILY
Qty: 30 TABLET | Refills: 5 | Status: SHIPPED | OUTPATIENT
Start: 2025-06-13

## 2025-07-14 ENCOUNTER — OFFICE VISIT (OUTPATIENT)
Age: 70
End: 2025-07-14
Payer: MEDICARE

## 2025-07-14 ENCOUNTER — TELEPHONE (OUTPATIENT)
Age: 70
End: 2025-07-14

## 2025-07-14 VITALS — WEIGHT: 315 LBS | BODY MASS INDEX: 45.1 KG/M2 | RESPIRATION RATE: 20 BRPM | HEIGHT: 70 IN

## 2025-07-14 DIAGNOSIS — B35.1 ONYCHOMYCOSIS: ICD-10-CM

## 2025-07-14 DIAGNOSIS — E66.01 MORBID OBESITY: ICD-10-CM

## 2025-07-14 DIAGNOSIS — L84 CORNS: ICD-10-CM

## 2025-07-14 DIAGNOSIS — I87.2 VENOUS INSUFFICIENCY: ICD-10-CM

## 2025-07-14 DIAGNOSIS — M20.41 HAMMER TOES OF BOTH FEET: ICD-10-CM

## 2025-07-14 DIAGNOSIS — I89.0 LYMPHEDEMA: Primary | ICD-10-CM

## 2025-07-14 DIAGNOSIS — E11.42 TYPE 2 DIABETES MELLITUS WITH DIABETIC POLYNEUROPATHY, WITHOUT LONG-TERM CURRENT USE OF INSULIN: ICD-10-CM

## 2025-07-14 DIAGNOSIS — M20.42 HAMMER TOES OF BOTH FEET: ICD-10-CM

## 2025-07-14 NOTE — TELEPHONE ENCOUNTER
LVM that we have had some cancellations and if patient would like to come in early at 2:15 that he can do so. If he cannot come in early he can keep original appointment time but it is an option to come in early.

## 2025-07-16 NOTE — PROGRESS NOTES
07/14/2025  Foot and Ankle Surgery - Established Patient/Follow-up  Provider: Dr. Aleks Mullins DPM  Location: [facility]    Subjective:  Joseph Mcdonald is a 69 y.o. male following up for diabetic preventative footcare, painful nails bilaterally and peripheral neuropathy.  Patient has been taking gabapentin 100 mg 3 times a day with minimal relief.  Patient is unable to apply topical medication due to inability to reach lower extremities.  Patient is also unable to wear compression stockings bilaterally.  Patient inquiring about home pneumatic compression pumps.    Chief Complaint   Patient presents with    Left Foot - Follow-up     DFE - neuropathy    Right Foot - Follow-up     DFE -  neuropathy    Follow-up     PCP: Dexter Paredes MD  Last PCP Visit: 3/17/25       Allergies   Allergen Reactions    Acetaminophen Anaphylaxis    Codeine Diarrhea and Nausea And Vomiting    Sulfa Antibiotics Other (See Comments)     Can't remember reaction type - over 40 years ago    Penicillins Other (See Comments)     Pt dont remember but did have an allergic reaction to it       Current Outpatient Medications on File Prior to Visit   Medication Sig Dispense Refill    albuterol (ACCUNEB) 1.25 MG/3ML nebulizer solution Take 3 mL by nebulization Every 6 (Six) Hours As Needed for Shortness of Air. 50 each 12    aspirin 81 MG EC tablet Take 1 tablet by mouth As Needed.      atorvastatin (LIPITOR) 20 MG tablet TAKE 1 TABLET BY MOUTH DAILY 90 tablet 1    cyanocobalamin ([employer] Vitamin B-12) 1000 MCG tablet Take 1 tablet by mouth Daily. 90 tablet 3    diphenhydrAMINE (BENADRYL) 25 mg capsule Take 1 capsule by mouth Every 6 (Six) Hours As Needed for Itching.      doxazosin (CARDURA) 4 MG tablet TAKE ONE TABLET BY MOUTH ONCE NIGHTLY 90 tablet 2    furosemide (LASIX) 40 MG tablet TAKE 1 TABLET BY MOUTH TWICE A  tablet 2    gabapentin (NEURONTIN) 100 MG capsule Take 2 capsules at night for two weeks, then increase to 2  "capsules twice a day. 120 capsule 2    glimepiride (AMARYL) 4 MG tablet Take 1 tablet by mouth.      metFORMIN (GLUCOPHAGE) 500 MG tablet Take 2 tablets by mouth 2 (Two) Times a Day With Meals. 360 tablet 2    pantoprazole (PROTONIX) 40 MG EC tablet TAKE 1 TABLET BY MOUTH DAILY 30 tablet 5    triamcinolone (KENALOG) 0.1 % ointment       glipizide (GLUCOTROL) 5 MG tablet Take 2 tablets by mouth 2 (Two) Times a Day Before Meals.      pioglitazone (ACTOS) 45 MG tablet Take 1 tablet by mouth Daily.       No current facility-administered medications on file prior to visit.       Objective   Resp 20   Ht 177.8 cm (70\")   Wt (!) 207 kg (457 lb)   BMI 65.57 kg/m²     Foot/Ankle Exam    GENERAL  Diabetic foot exam performed    Appearance:  appears stated age and obese  Orientation:  AAOx3  Affect:  appropriate  Gait:  shuffling  Assistance:  independent and walker  Right shoe gear: diabetic shoe  Left shoe gear: diabetic shoe    VASCULAR     Right Foot Vascularity   Dorsalis pedis:  Doppler  Posterior tibial:  Doppler  Skin temperature:  warm  Edema grading:  3+ and pitting  CFT:  3  Pedal hair growth:  Absent  Varicosities:  moderate varicosities     Left Foot Vascularity   Dorsalis pedis:  Doppler  Posterior tibial:  Doppler  Skin temperature:  warm  Edema grading:  3+ and pitting  CFT:  3  Pedal hair growth:  Absent  Varicosities:  moderate varicosities     NEUROLOGIC     Right Foot Neurologic   Light touch sensation: diminished  Vibratory sensation: diminished  Hot/Cold sensation: normal  Protective Sensation using [place]-Zuri Monofilament:   Sites intact: 7  Sites tested: 10  Normal reflexes    Achilles reflex:  2+  Babinski reflex:  2+     Left Foot Neurologic   Light touch sensation: diminished  Vibratory sensation: diminished  Hot/Cold sensation:  normal  Protective Sensation using [place]-Zuri Monofilament:   Sites intact: 6  Sites tested: 10  Normal reflexes    Achilles reflex:  2+  Babinski reflex:  " 2+    MUSCULOSKELETAL     Right Foot Musculoskeletal   Ecchymosis:  (Duskiness and ecchymosis to entire right leg extending to dorsal forefoot)  Tenderness:  (Hyper Nativity with palpation of lesser digits)  Hammertoe:  Second toe, third toe and fourth toe     Left Foot Musculoskeletal   Tenderness:  (Hypertensive to be with palpation of all digits left foot)  Hammertoe:  Second toe, third toe and fourth toe    MUSCLE STRENGTH     Right Foot Muscle Strength   Normal strength    Foot dorsiflexion:  5  Foot plantar flexion:  5  Foot inversion:  5  Foot eversion:  5     Left Foot Muscle Strength   Normal strength    Foot dorsiflexion:  5  Foot plantar flexion:  5  Foot inversion:  5  Foot eversion:  5    RANGE OF MOTION     Right Foot Range of Motion   Foot and ankle ROM within normal limits    Ankle dorsiflexion: decreased   Ankle plantar flexion: decreased      Left Foot Range of Motion   Foot and ankle ROM within normal limits    Ankle dorsiflexion: decreased  Ankle plantar flexion: decreased    DERMATOLOGIC      Right Foot Dermatologic   Skin  Positive for corn, dryness and erythema.   Nails  1.  Positive for elongated, abnormal thickness and dystrophic nail.  2.  Positive for elongated, abnormal thickness and dystrophic nail.  3.  Positive for elongated, abnormal thickness and dystrophic nail.  4.  Positive for elongated, abnormal thickness and dystrophic nail.  5.  Positive for elongated, abnormal thickness and dystrophic nail.     Left Foot Dermatologic   Skin  Positive for corn and erythema.   Nails  1.  Positive for elongated, abnormal thickness and dystrophic nail.  2.  Positive for elongated, abnormal thickness and dystrophic nail.  3.  Positive for elongated, abnormal thickness and dystrophic nail.  4.  Positive for elongated, abnormally thick and dystrophic nail.  5.  Positive for elongated, abnormally thick and dystrophic nail.          Assessment & Plan   Diagnoses and all orders for this visit:    1.  Lymphedema (Primary)    2. Type 2 diabetes mellitus with diabetic polyneuropathy, without long-term current use of insulin    3. Onychomycosis    4. Corns    5. Hammer toes of both feet    6. Venous insufficiency    7. Morbid obesity       Discussed with the patient concerning their lower extremity lymphedema.  Treatment options discussed including exercise, manual lymphatic drainage, use of intermittent pneumatic compression pumps, compression stockings, and good skin care.  Patient to purchase compression stockings ranging from 15-20 mmHg.  Patient to elevate affected extremity daily.    Nail debridement: Both feet x10    Consent and time out was performed before proceeding with the procedure. Nails were debrided with a nail nipper without complication.  No anesthesia was required.  Indications for procedure were thickened, dystrophic, and fungal appearing nails which are difficult to trim.  Proper self-care and technique was discussed with patient.  Patient was stable after procedure.    Callus paring performed with #15 blade to the following locations.  Plantar hallux IPJ bilaterally, plantar fifth MPJ bilaterally    Patient still having neuropathic symptoms despite gabapentin dosage.  Patient is on low-dose gabapentin and can increase.  Patient will talk with Dr. Paredes about increasing his gabapentin dose.    Patient follow-up in 9 weeks for preventative diabetic care.             Procedures     Munir Mullins DPM

## 2025-08-14 ENCOUNTER — OFFICE VISIT (OUTPATIENT)
Dept: FAMILY MEDICINE CLINIC | Facility: CLINIC | Age: 70
End: 2025-08-14
Payer: MEDICARE

## 2025-08-14 VITALS — HEIGHT: 70 IN | WEIGHT: 315 LBS | HEART RATE: 87 BPM | BODY MASS INDEX: 45.1 KG/M2 | OXYGEN SATURATION: 90 %

## 2025-08-14 DIAGNOSIS — I10 PRIMARY HYPERTENSION: Primary | ICD-10-CM

## 2025-08-14 DIAGNOSIS — M25.512 CHRONIC LEFT SHOULDER PAIN: ICD-10-CM

## 2025-08-14 DIAGNOSIS — G58.8 OTHER MONONEUROPATHY: ICD-10-CM

## 2025-08-14 DIAGNOSIS — E66.01 MORBID OBESITY: ICD-10-CM

## 2025-08-14 DIAGNOSIS — E78.5 HYPERLIPIDEMIA, UNSPECIFIED HYPERLIPIDEMIA TYPE: ICD-10-CM

## 2025-08-14 DIAGNOSIS — G89.29 CHRONIC LEFT SHOULDER PAIN: ICD-10-CM

## 2025-08-14 RX ORDER — BLOOD-GLUCOSE METER
EACH MISCELLANEOUS
COMMUNITY
Start: 2025-05-08

## 2025-08-14 RX ORDER — BLOOD SUGAR DIAGNOSTIC
STRIP MISCELLANEOUS
COMMUNITY
Start: 2025-05-08

## 2025-08-14 RX ORDER — LANCETS
EACH MISCELLANEOUS
COMMUNITY
Start: 2025-05-08

## (undated) DEVICE — GW HITORQUE/BAL MID/WT J W/HCOAT .014 3X190CM

## (undated) DEVICE — GW EMR FIX EXCHG J STD .035 3MM 260CM

## (undated) DEVICE — KT MANIFLD CARDIAC

## (undated) DEVICE — BALN PRESS WEDGE 5F 110CM

## (undated) DEVICE — PK CATH CARD 40

## (undated) DEVICE — GLIDESHEATH BASIC HYDROPHILIC COATED INTRODUCER SHEATH: Brand: GLIDESHEATH